# Patient Record
Sex: FEMALE | Race: WHITE | NOT HISPANIC OR LATINO | Employment: FULL TIME | URBAN - METROPOLITAN AREA
[De-identification: names, ages, dates, MRNs, and addresses within clinical notes are randomized per-mention and may not be internally consistent; named-entity substitution may affect disease eponyms.]

---

## 2017-12-14 ENCOUNTER — LAB REQUISITION (OUTPATIENT)
Dept: LAB | Facility: HOSPITAL | Age: 32
End: 2017-12-14

## 2017-12-14 DIAGNOSIS — K82.8 OTHER SPECIFIED DISEASES OF GALLBLADDER (CODE): ICD-10-CM

## 2017-12-14 PROCEDURE — 88304 TISSUE EXAM BY PATHOLOGIST: CPT | Performed by: SURGERY

## 2017-12-15 LAB
CYTO UR: NORMAL
LAB AP CASE REPORT: NORMAL
LAB AP CLINICAL INFORMATION: NORMAL
Lab: NORMAL
PATH REPORT.FINAL DX SPEC: NORMAL
PATH REPORT.GROSS SPEC: NORMAL

## 2019-01-10 ENCOUNTER — LAB REQUISITION (OUTPATIENT)
Dept: LAB | Facility: HOSPITAL | Age: 34
End: 2019-01-10

## 2019-01-10 ENCOUNTER — APPOINTMENT (OUTPATIENT)
Dept: LAB | Facility: HOSPITAL | Age: 34
End: 2019-01-10

## 2019-01-10 DIAGNOSIS — Z00.00 ROUTINE GENERAL MEDICAL EXAMINATION AT A HEALTH CARE FACILITY: ICD-10-CM

## 2019-01-10 LAB
TESTOST SERPL-MCNC: 72.97 NG/DL (ref 0–813.86)
TSH SERPL DL<=0.05 MIU/L-ACNC: 1.81 MIU/ML (ref 0.35–5.35)

## 2019-01-10 PROCEDURE — 84403 ASSAY OF TOTAL TESTOSTERONE: CPT | Performed by: OBSTETRICS & GYNECOLOGY

## 2019-01-10 PROCEDURE — 84443 ASSAY THYROID STIM HORMONE: CPT | Performed by: OBSTETRICS & GYNECOLOGY

## 2019-01-10 PROCEDURE — 36415 COLL VENOUS BLD VENIPUNCTURE: CPT | Performed by: OBSTETRICS & GYNECOLOGY

## 2019-01-11 ENCOUNTER — TRANSCRIBE ORDERS (OUTPATIENT)
Dept: ADMINISTRATIVE | Facility: HOSPITAL | Age: 34
End: 2019-01-11

## 2019-01-11 DIAGNOSIS — Z80.3 FAMILY HISTORY OF MALIGNANT NEOPLASM OF BREAST: ICD-10-CM

## 2019-01-11 DIAGNOSIS — Z12.31 VISIT FOR SCREENING MAMMOGRAM: Primary | ICD-10-CM

## 2019-02-06 ENCOUNTER — OFFICE VISIT (OUTPATIENT)
Dept: RETAIL CLINIC | Facility: CLINIC | Age: 34
End: 2019-02-06

## 2019-02-06 VITALS
TEMPERATURE: 97.6 F | DIASTOLIC BLOOD PRESSURE: 82 MMHG | SYSTOLIC BLOOD PRESSURE: 128 MMHG | HEIGHT: 66 IN | HEART RATE: 104 BPM | BODY MASS INDEX: 36.96 KG/M2 | RESPIRATION RATE: 12 BRPM | WEIGHT: 230 LBS | OXYGEN SATURATION: 98 %

## 2019-02-06 DIAGNOSIS — J02.9 SORETHROAT: Primary | ICD-10-CM

## 2019-02-06 DIAGNOSIS — R68.89 FLU-LIKE SYMPTOMS: ICD-10-CM

## 2019-02-06 LAB
EXPIRATION DATE: NORMAL
EXPIRATION DATE: NORMAL
FLUAV AG NPH QL: NEGATIVE
FLUBV AG NPH QL: NEGATIVE
INTERNAL CONTROL: NORMAL
INTERNAL CONTROL: NORMAL
Lab: NORMAL
Lab: NORMAL
S PYO AG THROAT QL: NEGATIVE

## 2019-02-06 PROCEDURE — 87880 STREP A ASSAY W/OPTIC: CPT | Performed by: NURSE PRACTITIONER

## 2019-02-06 PROCEDURE — 87804 INFLUENZA ASSAY W/OPTIC: CPT | Performed by: NURSE PRACTITIONER

## 2019-02-06 PROCEDURE — 99213 OFFICE O/P EST LOW 20 MIN: CPT | Performed by: NURSE PRACTITIONER

## 2019-02-06 RX ORDER — ATORVASTATIN CALCIUM 40 MG/1
TABLET, FILM COATED ORAL
COMMUNITY
Start: 2018-11-28 | End: 2020-09-23 | Stop reason: SDUPTHER

## 2019-02-06 RX ORDER — LOSARTAN POTASSIUM 100 MG/1
TABLET ORAL
COMMUNITY
Start: 2018-11-28 | End: 2020-09-23 | Stop reason: SDUPTHER

## 2019-02-06 RX ORDER — HYDROCHLOROTHIAZIDE 12.5 MG/1
TABLET ORAL
COMMUNITY
Start: 2018-11-28 | End: 2020-12-22 | Stop reason: ALTCHOICE

## 2019-02-06 RX ORDER — METFORMIN HYDROCHLORIDE 500 MG/1
TABLET, EXTENDED RELEASE ORAL
COMMUNITY
Start: 2018-11-28 | End: 2020-09-23

## 2019-02-06 RX ORDER — AZITHROMYCIN 250 MG/1
TABLET, FILM COATED ORAL
Qty: 6 TABLET | Refills: 0 | Status: SHIPPED | OUTPATIENT
Start: 2019-02-06 | End: 2020-12-22 | Stop reason: ALTCHOICE

## 2019-02-06 RX ORDER — DEXTROMETHORPHAN HYDROBROMIDE AND PROMETHAZINE HYDROCHLORIDE 15; 6.25 MG/5ML; MG/5ML
5 SYRUP ORAL 4 TIMES DAILY PRN
Qty: 120 ML | Refills: 0 | Status: SHIPPED | OUTPATIENT
Start: 2019-02-06 | End: 2019-02-11

## 2019-02-06 NOTE — PROGRESS NOTES
"Ramiro George is a 33 y.o. female.   /82   Pulse 104   Temp 97.6 °F (36.4 °C) (Oral)   Resp 12   Ht 167.6 cm (66\")   Wt 104 kg (230 lb)   SpO2 98%   BMI 37.12 kg/m²   No past medical history on file.  No Known Allergies      Sore Throat    This is a new problem. Episode onset: past 2 days. The problem has been gradually worsening. Maximum temperature: subjective. The pain is moderate. Associated symptoms include congestion, coughing and headaches. Pertinent negatives include no abdominal pain, diarrhea, drooling, ear discharge, ear pain, hoarse voice, plugged ear sensation, neck pain, shortness of breath, stridor, swollen glands, trouble swallowing or vomiting.        The following portions of the patient's history were reviewed and updated as appropriate: allergies, current medications, past family history, past medical history, past social history, past surgical history and problem list.    Review of Systems   HENT: Positive for congestion and sore throat. Negative for drooling, ear discharge, ear pain, hoarse voice and trouble swallowing.    Respiratory: Positive for cough. Negative for shortness of breath and stridor.    Gastrointestinal: Negative for abdominal pain, diarrhea and vomiting.   Musculoskeletal: Negative for neck pain.   Neurological: Positive for headaches.       Objective   Physical Exam   Constitutional: She appears well-developed and well-nourished.  Non-toxic appearance. She appears ill.   HENT:   Head: Normocephalic and atraumatic.   Right Ear: Tympanic membrane and ear canal normal.   Left Ear: Tympanic membrane and ear canal normal.   Nose: Mucosal edema and rhinorrhea present. Right sinus exhibits no maxillary sinus tenderness and no frontal sinus tenderness. Left sinus exhibits no maxillary sinus tenderness and no frontal sinus tenderness.   Mouth/Throat: Uvula is midline. Posterior oropharyngeal erythema present.   Cardiovascular: Regular rhythm and normal heart " sounds.   Pulmonary/Chest: Effort normal. She has no wheezes. She has no rhonchi. She has no rales.   Lymphadenopathy:     She has no cervical adenopathy.   Skin: Skin is warm and dry.   + flu exposure    Assessment/Plan   Anita was seen today for sinusitis, headache, sore throat and generalized body aches.    Diagnoses and all orders for this visit:    Sorethroat  -     POC Rapid Strep A    Flu-like symptoms  -     POC Influenza A / B    Other orders  -     promethazine-dextromethorphan (PROMETHAZINE-DM) 6.25-15 MG/5ML syrup; Take 5 mL by mouth 4 (Four) Times a Day As Needed for Cough for up to 5 days.  -     azithromycin (ZITHROMAX Z-DARIA) 250 MG tablet; Take 2 tablets the first day, then 1 tablet daily for 4 days.    Pt advised this is likely viral in nature. ONLY start antibiotic if symptoms last longer than 7 days. Pt states understanding.       Pt offered tamiflu but declined.   Results for orders placed or performed in visit on 02/06/19   POC Rapid Strep A   Result Value Ref Range    Rapid Strep A Screen Negative Negative, VALID, INVALID, Not Performed    Internal Control Passed Passed    Lot Number [id number]     Expiration Date 6,302,020    POC Influenza A / B   Result Value Ref Range    Rapid Influenza A Ag Negative Negative    Rapid Influenza B Ag Negative Negative    Internal Control Passed Passed    Lot Number [id number]     Expiration Date 5,162,021

## 2019-02-06 NOTE — PATIENT INSTRUCTIONS
Viral Respiratory Infection  A respiratory infection is an illness that affects part of the respiratory system, such as the lungs, nose, or throat. Most respiratory infections are caused by either viruses or bacteria. A respiratory infection that is caused by a virus is called a viral respiratory infection.  Common types of viral respiratory infections include:  · A cold.  · The flu (influenza).  · A respiratory syncytial virus (RSV) infection.    How do I know if I have a viral respiratory infection?  Most viral respiratory infections cause:  · A stuffy or runny nose.  · Yellow or green nasal discharge.  · A cough.  · Sneezing.  · Fatigue.  · Achy muscles.  · A sore throat.  · Sweating or chills.  · A fever.  · A headache.    How are viral respiratory infections treated?  If influenza is diagnosed early, it may be treated with an antiviral medicine that shortens the length of time a person has symptoms. Symptoms of viral respiratory infections may be treated with over-the-counter and prescription medicines, such as:  · Expectorants. These make it easier to cough up mucus.  · Decongestant nasal sprays.    Health care providers do not prescribe antibiotic medicines for viral infections. This is because antibiotics are designed to kill bacteria. They have no effect on viruses.  How do I know if I should stay home from work or school?  To avoid exposing others to your respiratory infection, stay home if you have:  · A fever.  · A persistent cough.  · A sore throat.  · A runny nose.  · Sneezing.  · Muscles aches.  · Headaches.  · Fatigue.  · Weakness.  · Chills.  · Sweating.  · Nausea.    Follow these instructions at home:  · Rest as much as possible.  · Take over-the-counter and prescription medicines only as told by your health care provider.  · Drink enough fluid to keep your urine clear or pale yellow. This helps prevent dehydration and helps loosen up mucus.  · Gargle with a salt-water mixture 3-4 times per day or  as needed. To make a salt-water mixture, completely dissolve ½-1 tsp of salt in 1 cup of warm water.  · Use nose drops made from salt water to ease congestion and soften raw skin around your nose.  · Do not drink alcohol.  · Do not use tobacco products, including cigarettes, chewing tobacco, and e-cigarettes. If you need help quitting, ask your health care provider.  Contact a health care provider if:  · Your symptoms last for 10 days or longer.  · Your symptoms get worse over time.  · You have a fever.  · You have severe sinus pain in your face or forehead.  · The glands in your jaw or neck become very swollen.  Get help right away if:  · You feel pain or pressure in your chest.  · You have shortness of breath.  · You faint or feel like you will faint.  · You have severe and persistent vomiting.  · You feel confused or disoriented.  This information is not intended to replace advice given to you by your health care provider. Make sure you discuss any questions you have with your health care provider.  Document Released: 09/27/2006 Document Revised: 05/25/2017 Document Reviewed: 05/25/2016  [employer] Education © 2018 [employer]

## 2019-02-22 ENCOUNTER — HOSPITAL ENCOUNTER (OUTPATIENT)
Dept: MAMMOGRAPHY | Facility: HOSPITAL | Age: 34
End: 2019-02-22
Attending: OBSTETRICS & GYNECOLOGY

## 2019-03-07 ENCOUNTER — HOSPITAL ENCOUNTER (OUTPATIENT)
Dept: MAMMOGRAPHY | Facility: HOSPITAL | Age: 34
Discharge: HOME | End: 2019-03-07
Attending: OBSTETRICS & GYNECOLOGY | Admitting: OBSTETRICS & GYNECOLOGY

## 2019-03-07 DIAGNOSIS — Z80.3 FAMILY HISTORY OF MALIGNANT NEOPLASM OF BREAST: ICD-10-CM

## 2019-03-07 DIAGNOSIS — Z12.31 VISIT FOR SCREENING MAMMOGRAM: ICD-10-CM

## 2019-03-07 PROCEDURE — 77067 SCR MAMMO BI INCL CAD: CPT | Performed by: RADIOLOGY

## 2019-03-07 PROCEDURE — 77063 BREAST TOMOSYNTHESIS BI: CPT

## 2019-03-07 PROCEDURE — 77067 SCR MAMMO BI INCL CAD: CPT

## 2019-03-07 PROCEDURE — 77063 BREAST TOMOSYNTHESIS BI: CPT | Performed by: RADIOLOGY

## 2020-08-21 RX ORDER — FLUCONAZOLE 100 MG/1
100 TABLET ORAL DAILY
Qty: 3 TABLET | Refills: 0 | Status: SHIPPED | OUTPATIENT
Start: 2020-08-21 | End: 2020-08-24

## 2020-09-23 ENCOUNTER — OFFICE VISIT (OUTPATIENT)
Dept: FAMILY MEDICINE CLINIC | Facility: CLINIC | Age: 35
End: 2020-09-23

## 2020-09-23 VITALS
HEART RATE: 90 BPM | SYSTOLIC BLOOD PRESSURE: 116 MMHG | HEIGHT: 66 IN | DIASTOLIC BLOOD PRESSURE: 82 MMHG | WEIGHT: 190 LBS | TEMPERATURE: 98.1 F | BODY MASS INDEX: 30.53 KG/M2 | RESPIRATION RATE: 16 BRPM | OXYGEN SATURATION: 98 %

## 2020-09-23 DIAGNOSIS — K30 INDIGESTION: ICD-10-CM

## 2020-09-23 DIAGNOSIS — Z00.00 ENCOUNTER FOR WELL ADULT EXAM WITHOUT ABNORMAL FINDINGS: Primary | ICD-10-CM

## 2020-09-23 DIAGNOSIS — Z11.59 NEED FOR HEPATITIS C SCREENING TEST: ICD-10-CM

## 2020-09-23 DIAGNOSIS — E83.42 HYPOMAGNESEMIA: ICD-10-CM

## 2020-09-23 DIAGNOSIS — F41.8 DEPRESSION WITH ANXIETY: ICD-10-CM

## 2020-09-23 DIAGNOSIS — E66.9 CLASS 1 OBESITY WITH SERIOUS COMORBIDITY AND BODY MASS INDEX (BMI) OF 30.0 TO 30.9 IN ADULT, UNSPECIFIED OBESITY TYPE: ICD-10-CM

## 2020-09-23 DIAGNOSIS — I10 ESSENTIAL HYPERTENSION: ICD-10-CM

## 2020-09-23 DIAGNOSIS — E78.2 MIXED HYPERLIPIDEMIA: ICD-10-CM

## 2020-09-23 DIAGNOSIS — E55.9 VITAMIN D DEFICIENCY: ICD-10-CM

## 2020-09-23 PROBLEM — Z79.4 TYPE 2 DIABETES MELLITUS WITHOUT COMPLICATION, WITH LONG-TERM CURRENT USE OF INSULIN: Status: ACTIVE | Noted: 2020-09-23

## 2020-09-23 PROBLEM — E11.9 TYPE 2 DIABETES MELLITUS WITHOUT COMPLICATION, WITH LONG-TERM CURRENT USE OF INSULIN: Status: ACTIVE | Noted: 2020-09-23

## 2020-09-23 PROCEDURE — 99385 PREV VISIT NEW AGE 18-39: CPT | Performed by: PHYSICIAN ASSISTANT

## 2020-09-23 RX ORDER — BUPROPION HYDROCHLORIDE 150 MG/1
150 TABLET ORAL DAILY
Qty: 30 TABLET | Refills: 0 | Status: SHIPPED | OUTPATIENT
Start: 2020-09-23 | End: 2021-09-07

## 2020-09-23 RX ORDER — BUPROPION HYDROCHLORIDE 300 MG/1
TABLET ORAL
COMMUNITY
Start: 2020-09-17 | End: 2020-09-23

## 2020-09-23 RX ORDER — LOSARTAN POTASSIUM 100 MG/1
100 TABLET ORAL DAILY
Qty: 90 TABLET | Refills: 1 | Status: SHIPPED | OUTPATIENT
Start: 2020-09-23 | End: 2021-09-07

## 2020-09-23 RX ORDER — INSULIN GLARGINE 100 [IU]/ML
34 INJECTION, SOLUTION SUBCUTANEOUS NIGHTLY
COMMUNITY
Start: 2020-09-05 | End: 2021-09-07

## 2020-09-23 RX ORDER — OMEPRAZOLE 20 MG/1
20 CAPSULE, DELAYED RELEASE ORAL DAILY
Qty: 14 CAPSULE | Refills: 0 | Status: SHIPPED | OUTPATIENT
Start: 2020-09-23 | End: 2021-01-11 | Stop reason: SDUPTHER

## 2020-09-23 RX ORDER — AMLODIPINE BESYLATE 2.5 MG/1
TABLET ORAL
COMMUNITY
End: 2020-09-23 | Stop reason: SDUPTHER

## 2020-09-23 RX ORDER — ATORVASTATIN CALCIUM 40 MG/1
40 TABLET, FILM COATED ORAL DAILY
Qty: 90 TABLET | Refills: 1 | Status: SHIPPED | OUTPATIENT
Start: 2020-09-23 | End: 2022-10-11

## 2020-09-23 RX ORDER — DULOXETIN HYDROCHLORIDE 20 MG/1
20 CAPSULE, DELAYED RELEASE ORAL DAILY
Qty: 30 CAPSULE | Refills: 0 | Status: SHIPPED | OUTPATIENT
Start: 2020-09-23 | End: 2020-11-02

## 2020-09-23 RX ORDER — EMPAGLIFLOZIN 25 MG/1
TABLET, FILM COATED ORAL
COMMUNITY
Start: 2020-09-05 | End: 2020-11-03 | Stop reason: DRUGHIGH

## 2020-09-23 RX ORDER — AMLODIPINE BESYLATE 2.5 MG/1
2.5 TABLET ORAL DAILY
Qty: 90 TABLET | Refills: 1 | Status: SHIPPED | OUTPATIENT
Start: 2020-09-23 | End: 2021-01-11

## 2020-09-23 RX ORDER — ACYCLOVIR 800 MG/1
TABLET ORAL
COMMUNITY
Start: 2020-08-25 | End: 2020-12-22 | Stop reason: ALTCHOICE

## 2020-09-23 RX ORDER — SEMAGLUTIDE 1.34 MG/ML
INJECTION, SOLUTION SUBCUTANEOUS
COMMUNITY
Start: 2020-08-05 | End: 2020-12-22 | Stop reason: DRUGHIGH

## 2020-09-23 NOTE — PROGRESS NOTES
Chief Complaint   Patient presents with   • Establish Care     Transferring from Dr Dunn (he retired)        Subjective   The patient is a 35 y.o. female who presents to [facility] and for annual exam. 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 Waking up multiple times per night. Hard time shutting brain off   Melatonin gummies, stress relief gummies, and essential oils. Very little improvement. Melatonin makes her feel hung over      Last Colonoscopy:  2020. Normal (chronic issues with bowels, bloating, constipation alternating with diarrhea)      Past Gynecological History:     No LMP recorded. Patient has had an ablation.     Menses: regular every 28-30 days. Started again after ablation recently. Very heavy and cramping      Contraception: tubal ligation       Pap History:was done on approximately earlier this year and the result was: normal PAP.     Date of last mammogram: was done on approximately 1 year ago and the result was: Birads I (Normal). (mother has history of breast cancer)     Mother  of pancreatic cancer     Past Medical History,Medications, Allergies reviewed.     HPI  Having depression, anxiety, irritability   Been on Wellbutrin since April/ May. Causing ear ringing. Feels like symptoms were improving some but does not like the ear ringing   Has tried buspar in the past. Made her feel too loopy   Does not want to be on anything that will make her gain weight   Lost 25 lbs since May. Insulin got regulated. Carb cycling     Stopped HCTZ and metformin     BP well controlled right now     Has hypertension and hyperlipidemia. Due for labs to be checked. Also needs refills on her medications     Has Rx for acyclovir. Was getting sores on the inside of her mouth when she was having dental work and using teeth straightening trays. Has not done trial off.  "No hx of HSV that she is aware of     Endocrinology is managing her DM. Under good control.     Review of Systems   Constitutional: Negative.  Negative for chills, diaphoresis, fatigue and fever.   HENT: Negative.  Negative for congestion, ear discharge, ear pain, hearing loss, nosebleeds, postnasal drip, sinus pressure, sneezing and sore throat.    Eyes: Negative.    Respiratory: Negative.  Negative for cough, chest tightness, shortness of breath and wheezing.    Cardiovascular: Negative.  Negative for chest pain, palpitations and leg swelling.   Gastrointestinal: Positive for abdominal distention, constipation and diarrhea. Negative for abdominal pain, blood in stool, nausea and vomiting.        Belching    Genitourinary: Negative.  Negative for difficulty urinating, dysuria, flank pain, frequency, hematuria and urgency.   Musculoskeletal: Negative.  Negative for arthralgias, back pain, gait problem, joint swelling, myalgias, neck pain and neck stiffness.   Skin: Negative.  Negative for color change, pallor, rash and wound.   Neurological: Negative for dizziness, syncope, weakness, light-headedness, numbness and headaches.   Psychiatric/Behavioral: Positive for agitation, dysphoric mood and sleep disturbance. Negative for self-injury and suicidal ideas. The patient is nervous/anxious.        Objective     /82   Pulse 90   Temp 98.1 °F (36.7 °C)   Resp 16   Ht 167.6 cm (66\")   Wt 86.2 kg (190 lb)   SpO2 98%   BMI 30.67 kg/m²     Physical Exam  Vitals signs and nursing note reviewed.   Constitutional:       Appearance: She is well-developed.   HENT:      Head: Normocephalic and atraumatic.      Right Ear: Tympanic membrane, ear canal and external ear normal.      Left Ear: Tympanic membrane, ear canal and external ear normal.      Nose: Nose normal.      Mouth/Throat:      Pharynx: No oropharyngeal exudate.   Eyes:      Conjunctiva/sclera: Conjunctivae normal.   Neck:      Musculoskeletal: Normal range " of motion and neck supple.      Thyroid: No thyromegaly.      Trachea: No tracheal deviation.   Cardiovascular:      Rate and Rhythm: Normal rate and regular rhythm.      Heart sounds: Normal heart sounds.   Pulmonary:      Effort: Pulmonary effort is normal. No respiratory distress.      Breath sounds: Normal breath sounds. No wheezing or rales.   Chest:      Chest wall: No tenderness.   Abdominal:      General: Bowel sounds are normal. There is no distension.      Palpations: Abdomen is soft. There is no mass.      Tenderness: There is no abdominal tenderness. There is no guarding or rebound.      Hernia: No hernia is present.   Musculoskeletal: Normal range of motion.         General: No tenderness or deformity.   Lymphadenopathy:      Cervical: No cervical adenopathy.   Skin:     General: Skin is warm and dry.   Neurological:      Mental Status: She is alert and oriented to person, place, and time.   Psychiatric:         Mood and Affect: Mood normal.         Behavior: Behavior normal.         Thought Content: Thought content normal.         Judgment: Judgment normal.         Assessment/Plan     1. Encounter for well adult exam without abnormal findings  - CBC & Differential  - Comprehensive Metabolic Panel  - Hepatitis C Antibody  - TSH    2. Essential hypertension  - CBC & Differential  - Comprehensive Metabolic Panel  - amLODIPine (Norvasc) 2.5 MG tablet; Take 1 tablet by mouth Daily.  Dispense: 90 tablet; Refill: 1  - losartan (COZAAR) 100 MG tablet; Take 1 tablet by mouth Daily.  Dispense: 90 tablet; Refill: 1    3. Mixed hyperlipidemia  - CBC & Differential  - Comprehensive Metabolic Panel  - Lipid Panel  - atorvastatin (LIPITOR) 40 MG tablet; Take 1 tablet by mouth Daily.  Dispense: 90 tablet; Refill: 1    4. Need for hepatitis C screening test  - Hepatitis C Antibody    5. Vitamin D deficiency  - Vitamin D 25 Hydroxy    6. Hypomagnesemia  - Magnesium    7. Indigestion  - omeprazole (PrilOSEC) 20 MG  capsule; Take 1 capsule by mouth Daily.  Dispense: 14 capsule; Refill: 0    8. Depression with anxiety  - DULoxetine (Cymbalta) 20 MG capsule; Take 1 capsule by mouth Daily.  Dispense: 30 capsule; Refill: 0  - buPROPion XL (Wellbutrin XL) 150 MG 24 hr tablet; Take 1 tablet by mouth Daily.  Dispense: 30 tablet; Refill: 0    9. Class 1 obesity with serious comorbidity and body mass index (BMI) of 30.0 to 30.9 in adult, unspecified obesity type      Refills as outlined in plan. Will taper off wellbutrin and do trial of cymbalta for anxiety and depression  Labs as outlined in plan   Trial of omeprazole for GI symptoms   Physical form for school board completed and provided back to patient     Counseling was given to patient for the following topics: instructions for management, risk factor reductions, patient and family education, impressions and importance of treatment compliance . Total time of the encounter was 30 minutes and 15 minutes was spend counseling.    MARC Brown

## 2020-09-24 LAB
25(OH)D3+25(OH)D2 SERPL-MCNC: 61.4 NG/ML (ref 30–100)
ALBUMIN SERPL-MCNC: 4.4 G/DL (ref 3.8–4.8)
ALBUMIN/GLOB SERPL: 1.6 {RATIO} (ref 1.2–2.2)
ALP SERPL-CCNC: 85 IU/L (ref 39–117)
ALT SERPL-CCNC: 37 IU/L (ref 0–32)
AST SERPL-CCNC: 21 IU/L (ref 0–40)
BASOPHILS # BLD AUTO: 0 X10E3/UL (ref 0–0.2)
BASOPHILS NFR BLD AUTO: 0 %
BILIRUB SERPL-MCNC: 0.4 MG/DL (ref 0–1.2)
BUN SERPL-MCNC: 12 MG/DL (ref 6–20)
BUN/CREAT SERPL: 14 (ref 9–23)
CALCIUM SERPL-MCNC: 9.7 MG/DL (ref 8.7–10.2)
CHLORIDE SERPL-SCNC: 102 MMOL/L (ref 96–106)
CO2 SERPL-SCNC: 28 MMOL/L (ref 20–29)
CREAT SERPL-MCNC: 0.85 MG/DL (ref 0.57–1)
EOSINOPHIL # BLD AUTO: 0.1 X10E3/UL (ref 0–0.4)
EOSINOPHIL NFR BLD AUTO: 1 %
ERYTHROCYTE [DISTWIDTH] IN BLOOD BY AUTOMATED COUNT: 12.5 % (ref 11.7–15.4)
GLOBULIN SER CALC-MCNC: 2.7 G/DL (ref 1.5–4.5)
GLUCOSE SERPL-MCNC: 89 MG/DL (ref 65–99)
HCT VFR BLD AUTO: 44.6 % (ref 34–46.6)
HCV AB S/CO SERPL IA: <0.1 S/CO RATIO (ref 0–0.9)
HGB BLD-MCNC: 14.6 G/DL (ref 11.1–15.9)
IMM GRANULOCYTES # BLD AUTO: 0 X10E3/UL (ref 0–0.1)
IMM GRANULOCYTES NFR BLD AUTO: 0 %
LYMPHOCYTES # BLD AUTO: 4.5 X10E3/UL (ref 0.7–3.1)
LYMPHOCYTES NFR BLD AUTO: 33 %
MAGNESIUM SERPL-MCNC: 2 MG/DL (ref 1.6–2.3)
MCH RBC QN AUTO: 29.1 PG (ref 26.6–33)
MCHC RBC AUTO-ENTMCNC: 32.7 G/DL (ref 31.5–35.7)
MCV RBC AUTO: 89 FL (ref 79–97)
MONOCYTES # BLD AUTO: 0.7 X10E3/UL (ref 0.1–0.9)
MONOCYTES NFR BLD AUTO: 5 %
NEUTROPHILS # BLD AUTO: 8.3 X10E3/UL (ref 1.4–7)
NEUTROPHILS NFR BLD AUTO: 61 %
PLATELET # BLD AUTO: 448 X10E3/UL (ref 150–450)
POTASSIUM SERPL-SCNC: 4.2 MMOL/L (ref 3.5–5.2)
PROT SERPL-MCNC: 7.1 G/DL (ref 6–8.5)
RBC # BLD AUTO: 5.01 X10E6/UL (ref 3.77–5.28)
SODIUM SERPL-SCNC: 142 MMOL/L (ref 134–144)
TSH SERPL DL<=0.005 MIU/L-ACNC: 1.42 UIU/ML (ref 0.45–4.5)
WBC # BLD AUTO: 13.7 X10E3/UL (ref 3.4–10.8)

## 2020-09-28 NOTE — PROGRESS NOTES
I have reviewed the notes, assessments, and/or procedures performed by Jewell Granado, I concur with her documentation of Anita George.

## 2020-10-11 DIAGNOSIS — D72.829 LEUKOCYTOSIS, UNSPECIFIED TYPE: Primary | ICD-10-CM

## 2020-10-12 ENCOUNTER — TELEPHONE (OUTPATIENT)
Dept: FAMILY MEDICINE CLINIC | Facility: CLINIC | Age: 35
End: 2020-10-12

## 2020-10-12 NOTE — TELEPHONE ENCOUNTER
PT IS NEEDING A NEW REFERRAL PLACED FOR DR ZHOU ITS BEEN OVER 5 YEARS SINCE SHE HAD BEEN SEEN WITH DR ZHOU.  THEY NEED A NEW REFERRAL; HER OFFICE NOTE AND LAB WORK.  SHE NEEDS TO TRY AND GET IN THIS WEEK BY Friday WITH THEM SINCE SHE IS OFF WORK THIS WEEK.

## 2020-10-13 ENCOUNTER — TELEPHONE (OUTPATIENT)
Dept: FAMILY MEDICINE CLINIC | Facility: CLINIC | Age: 35
End: 2020-10-13

## 2020-10-13 DIAGNOSIS — B37.31 VAGINAL YEAST INFECTION: Primary | ICD-10-CM

## 2020-10-13 DIAGNOSIS — D72.829 LEUKOCYTOSIS, UNSPECIFIED TYPE: Primary | ICD-10-CM

## 2020-10-13 RX ORDER — FLUCONAZOLE 150 MG/1
150 TABLET ORAL ONCE
Qty: 1 TABLET | Refills: 0 | Status: SHIPPED | OUTPATIENT
Start: 2020-10-13 | End: 2020-10-13

## 2020-10-13 NOTE — TELEPHONE ENCOUNTER
Pt called she has a yeast inf, symptoms include white discharge, itching, and vaginal irritation. Wanted to see if you would rx for diflucan

## 2020-10-16 ENCOUNTER — CONSULT (OUTPATIENT)
Dept: ONCOLOGY | Facility: CLINIC | Age: 35
End: 2020-10-16

## 2020-10-16 ENCOUNTER — RESULTS ENCOUNTER (OUTPATIENT)
Dept: FAMILY MEDICINE CLINIC | Facility: CLINIC | Age: 35
End: 2020-10-16

## 2020-10-16 ENCOUNTER — LAB (OUTPATIENT)
Dept: LAB | Facility: HOSPITAL | Age: 35
End: 2020-10-16

## 2020-10-16 VITALS
HEART RATE: 87 BPM | SYSTOLIC BLOOD PRESSURE: 138 MMHG | OXYGEN SATURATION: 97 % | RESPIRATION RATE: 16 BRPM | BODY MASS INDEX: 30.37 KG/M2 | DIASTOLIC BLOOD PRESSURE: 62 MMHG | HEIGHT: 66 IN | WEIGHT: 189 LBS | TEMPERATURE: 98 F

## 2020-10-16 DIAGNOSIS — D72.829 LEUKOCYTOSIS, UNSPECIFIED TYPE: ICD-10-CM

## 2020-10-16 DIAGNOSIS — Z80.0 FAMILY HISTORY OF PANCREATIC CANCER: Primary | ICD-10-CM

## 2020-10-16 DIAGNOSIS — Z80.0 FAMILY HISTORY OF PANCREATIC CANCER: ICD-10-CM

## 2020-10-16 LAB
ALBUMIN SERPL-MCNC: 4.6 G/DL (ref 3.5–5.2)
ALBUMIN/GLOB SERPL: 1.7 G/DL
ALP SERPL-CCNC: 79 U/L (ref 39–117)
ALT SERPL W P-5'-P-CCNC: 29 U/L (ref 1–33)
ANION GAP SERPL CALCULATED.3IONS-SCNC: 9 MMOL/L (ref 5–15)
AST SERPL-CCNC: 22 U/L (ref 1–32)
BASOPHILS # BLD MANUAL: 0 10*3/MM3 (ref 0–0.2)
BASOPHILS NFR BLD AUTO: 0 % (ref 0–1.5)
BILIRUB SERPL-MCNC: 0.5 MG/DL (ref 0–1.2)
BUN SERPL-MCNC: 14 MG/DL (ref 6–20)
BUN/CREAT SERPL: 18.7 (ref 7–25)
CALCIUM SPEC-SCNC: 9.4 MG/DL (ref 8.6–10.5)
CHLORIDE SERPL-SCNC: 105 MMOL/L (ref 98–107)
CO2 SERPL-SCNC: 29 MMOL/L (ref 22–29)
CREAT SERPL-MCNC: 0.75 MG/DL (ref 0.57–1)
CRP SERPL-MCNC: 0.12 MG/DL (ref 0–0.5)
DEPRECATED RDW RBC AUTO: 39.8 FL (ref 37–54)
EOSINOPHIL # BLD MANUAL: 0.29 10*3/MM3 (ref 0–0.4)
EOSINOPHIL NFR BLD MANUAL: 2 % (ref 0.3–6.2)
ERYTHROCYTE [DISTWIDTH] IN BLOOD BY AUTOMATED COUNT: 12.4 % (ref 12.3–15.4)
ERYTHROCYTE [SEDIMENTATION RATE] IN BLOOD: 21 MM/HR (ref 0–20)
GFR SERPL CREATININE-BSD FRML MDRD: 87 ML/MIN/1.73
GLOBULIN UR ELPH-MCNC: 2.7 GM/DL
GLUCOSE SERPL-MCNC: 77 MG/DL (ref 65–99)
HCT VFR BLD AUTO: 43.7 % (ref 34–46.6)
HGB BLD-MCNC: 14.4 G/DL (ref 12–15.9)
LYMPHOCYTES # BLD MANUAL: 5.45 10*3/MM3 (ref 0.7–3.1)
LYMPHOCYTES NFR BLD MANUAL: 10 % (ref 5–12)
LYMPHOCYTES NFR BLD MANUAL: 38 % (ref 19.6–45.3)
MCH RBC QN AUTO: 29.1 PG (ref 26.6–33)
MCHC RBC AUTO-ENTMCNC: 33 G/DL (ref 31.5–35.7)
MCV RBC AUTO: 88.3 FL (ref 79–97)
MONOCYTES # BLD AUTO: 1.43 10*3/MM3 (ref 0.1–0.9)
NEUTROPHILS # BLD AUTO: 6.59 10*3/MM3 (ref 1.7–7)
NEUTROPHILS NFR BLD MANUAL: 46 % (ref 42.7–76)
PLAT MORPH BLD: NORMAL
PLATELET # BLD AUTO: 473 10*3/MM3 (ref 140–450)
PMV BLD AUTO: 9.2 FL (ref 6–12)
POTASSIUM SERPL-SCNC: 3.7 MMOL/L (ref 3.5–5.2)
PROT SERPL-MCNC: 7.3 G/DL (ref 6–8.5)
RBC # BLD AUTO: 4.95 10*6/MM3 (ref 3.77–5.28)
RBC MORPH BLD: NORMAL
SODIUM SERPL-SCNC: 143 MMOL/L (ref 136–145)
VARIANT LYMPHS NFR BLD MANUAL: 4 % (ref 0–5)
WBC # BLD AUTO: 14.33 10*3/MM3 (ref 3.4–10.8)
WBC MORPH BLD: NORMAL

## 2020-10-16 PROCEDURE — 81206 BCR/ABL1 GENE MAJOR BP: CPT

## 2020-10-16 PROCEDURE — 85060 BLOOD SMEAR INTERPRETATION: CPT

## 2020-10-16 PROCEDURE — 85007 BL SMEAR W/DIFF WBC COUNT: CPT | Performed by: PHYSICIAN ASSISTANT

## 2020-10-16 PROCEDURE — 85652 RBC SED RATE AUTOMATED: CPT

## 2020-10-16 PROCEDURE — 80053 COMPREHEN METABOLIC PANEL: CPT

## 2020-10-16 PROCEDURE — 86140 C-REACTIVE PROTEIN: CPT

## 2020-10-16 PROCEDURE — 81207 BCR/ABL1 GENE MINOR BP: CPT

## 2020-10-16 PROCEDURE — 99204 OFFICE O/P NEW MOD 45 MIN: CPT | Performed by: INTERNAL MEDICINE

## 2020-10-16 PROCEDURE — 36415 COLL VENOUS BLD VENIPUNCTURE: CPT

## 2020-10-16 PROCEDURE — 85027 COMPLETE CBC AUTOMATED: CPT | Performed by: PHYSICIAN ASSISTANT

## 2020-10-16 RX ORDER — ACYCLOVIR 400 MG/1
TABLET ORAL
COMMUNITY
Start: 2020-10-06 | End: 2020-12-22 | Stop reason: ALTCHOICE

## 2020-10-16 NOTE — PROGRESS NOTES
CHIEF COMPLAINT: Chronic leukocytosis with family history of breast and pancreatic cancer in her mother    REASON FOR REFERRAL: Same      RECORDS OBTAINED  Records of the patients history including those obtained from Dr. Jones were reviewed and summarized in detail.    Oncology/Hematology History Overview Note   1.  Chronic leukocytosis  2.  Anxiety depression  3.  Reflux with history of H. pylori positivity  4.  Questionable psoriatic arthritis with presumably falsely positive FARIBA and rheumatoid arthritis rheumatoid factor positive  5.  Migraines  6.  Dyslipidemia  7.  Diabetes  8.  Polycystic ovaries   9.  History of renal calculi  10.  History of pulmonary nodules with unremarkable CT per Dr. Goodwin December 2015  11.  Cholecystectomy for cholecystitis by Dr. epps 12/14/2017  12.  Atopic dermatitis  13.  Exogenous obesity  14.  Mother had breast cancer and pancreatic cancer (patient of mine)    Hematology history:  -8/10/2015 initial [facility] hematology consultation:referred to our office for further evaluation and recommendations in  regard to her leukocytosis. The patient states that for about the past 9 months  or so she has been feeling extremely fatigued to the point that it is  interfering with her daily life. She states that she was noted to have had  slightly elevated white blood cell count on a routine lab work initially  several months ago but at that time had attributed the slight elevation to a  possible sinus infection. She states that on further evaluation in regard to  her ongoing fatigue, more blood work was drawn and she was noted to have an  elevated white count that continued. Recent lab work includes CBC on 08/10/2015  with WBC 11.9, hemoglobin 13.2, hematocrit 38.8%, platelet count 343,000.  Differential was within normal limits other than for absolute lymphocytes  slightly elevated at 5.1.  Previous CBCs include 07/07/2015: WBC 13.9, normal  hemoglobin 13.5, hematocrit 40.8%, platelets  "slightly elevated at 398,000 and  differential within normal limits other than for absolute neutrophils of 8.2  and absolute lymphocytes of 5.0. At that same time, sedimentation rate was  normal at 6, T3 normal at 2.7, vitamin D 25-hydroxy was normal at 31.2.      The patient denies any fevers. She does report diffuse joint pain that is worse  in her right wrist and index finger. She also had some tenderness and slight  discomfort in her breast that seems to occur around the time of her menstrual  cycle. She has dry mouth and occasionally will notice a small ulceration in the  inner part of cheek. She also states that she has a nasal ulceration that seems  to \"come and go\".  She has not noted any heart palpitations or chest pain. She  has no shortness of breath other than for the feelings of shortness of breath  that seem to come with extreme fatigue. She has occasional swelling of her feet  that waxes and wanes. She has a history of migraine headaches. She feels  generalized weakness. She reports occasional numbness and tingling in her  hands. She reports a 15 pound weight gain over the past 10 months. She states  that she has chronic constipation, but denies any hematochezia or melena.     I suspect this is a reactive leukocytosis.  The odds of chronic myelogenous leukemia at this young age is very unlikely. We  will check her sedimentation rate and C-reactive protein again, the latter of  which had been a little elevated she says but the sedimentation rate has never  been abnormal thus far. We will also check her serum immunoelectrophoresis. We  will look at her peripheral smear for immature forms. Assuming all this is  negative, we planned to draw at that junction her BCR/ABL gene rearrangement quantitative PCR but I think the odds of that at this young age being chronic myelogenous leukemia is very unlikely and I suspect she has some form of low level  autoimmune disease with the positive FARIBA that is causing " her myalgias,  arthralgias, and fatigue with joint stiffness when she gets up, better when she  gets moving, and some pain in her calcaneus, worse on the right. This could  also be psoriatic based on my examination but I defer of course to the  expertise of Dr. Stevenson Umana.      -8/10/2015 data showed serum immunoelectrophoresis showed polyclonal gammopathy consistent with inflammation.  Peripheral smear showed slight left shift with normal morphology otherwise.  White count was down to normal at 10,580.  C-reactive protein elevated at 7.58 with sedimentation rate of 26.    -8/19/2015 hematology follow-up visit: I reviewed her elevated sedimentation rate and C-reactive protein in the face of rheumatoid factor positive rheumatoid arthritis and psoriatic arthritis followed by Stevenson Umana and given her young age did not recommend BCR abl gene rearrangement testing given low likelihood for myeloproliferative disorder in the face of what is clearly an inflammatory process and to follow-up with rheumatology and primary care.  -12/12/2017 EGD with Dr. Selby on duodenal biopsy showed minimal chronic gastritis without metaplasia dysplasia or malignancy and colonoscopy was negative save for a hyperplastic polyp.  He ruled her out for celiac disease but her white count was 12,700 with a C-reactive protein still elevated at 14.6.  Strongyloidiasis negative.  -5/2/2019 white count normal 7700 with normal CBC, ALT 33, AST 42, sedimentation rate 19.  C-reactive protein 27 upper limit of normal 8.  Normal B12 and folate.  -7/12/2019 saw Dr. Akil Escobar at [facility] rheumatology who thought FARIBA was falsely positive.  He did not appreciate any obvious connective tissue disease but felt she had primary fibromyalgia syndrome.  He could not discern any systemic inflammatory symptoms.  He did not agree with her prior diagnosis of psoriatic arthritis and did not want her taking methotrexate.  Eyelid abnormalities he  thought from eczema or dermatitis and suggested follow-up with dermatology.  She did have a few psoriatic patches in her skin but without clinical evidence of psoriatic arthritis  -9/30/2019 C-reactive protein 15.2 upper limit of normal 8 with sedimentation rate 14 and normal liver enzymes.  FARIBA +1: 80 homogeneous.Placed her on Cymbalta.  -1/24/2020 CT abdomen pelvis with contrast [facility] showed hepato-steatosis, stable 3 mm lung nodule benign, punctate renal stones, increasing fatty atrophy of the pancreatic head.  Done for evaluation of elevated liver enzymes and A1c.  -2/27/2020 [facility] showed mild fatty atrophy pancreatic head with no solid or cystic pancreatic lesions.  Endoscopic ultrasound showed mild erythema gastric antrum.  Had fatty infiltration of the pancreas diffusely and fatty liver infiltration.  No significant pathology otherwise.  Based on if pancreatic fluid bicarbonate less than 80 mmol/L than that would be pancreatic insufficiency but if over 80 mmol/L than they would consider normal pancreatic function.  -9/23/2020 white count 13,700 with unremarkable differential and no immature forms with CMP unremarkable save for ALT slightly elevated at 37 upper limit 32 and negative hepatitis C antibody.  Primary care referred back again for the same reason.  -10/16/2020 [facility] hematology consultation: Patient returned again for same issue of chronic leukocytosis.  Now 5 years later with similar level of leukocytosis as to 5 years previous.  We will repeat her CBC, peripheral smear, CMP, urinalysis, sedimentation rate, C-reactive protein, and given all of the questions and despite my low suspicions for there being anything going on here given my prior statements and yet patient returning for same issue, I will check peripheral blood flow cytometry for circulating clone and BCR abl gene rearrangement which would be exceedingly unlikely to appear at the age of 35 and  actually younger since this been going on for quite some time.  The white count is almost assuredly related to the multiplicity of potential inflammatory causes as outlined above and unlikely to be pathological in and of itself.  There have never been significant nucleated red cells, immature forms, fragmented red cells or other cytopenias to go along with this.  She is on no medications that should routinely elevate the white count.  I doubt the white count has anything to do with a malignant process but with her mother's history I will get genetic counseling as her mother had pancreatic cancer come on quickly and did not have genetic testing results before her passing.       Leucocytosis       HISTORY OF PRESENT ILLNESS:  The patient is a 35 y.o.  female, referred for chronic leukocytosis.  See above for oncology/hematology history    REVIEW OF SYSTEMS:  A 14 point review of systems was performed and is negative except as noted above.    History reviewed. No pertinent past medical history.  History reviewed. No pertinent surgical history.    Current Outpatient Medications on File Prior to Visit   Medication Sig Dispense Refill   • acyclovir (ZOVIRAX) 400 MG tablet TAKE 2 TABLETS BY MOUTH TWICE A DAY FOR 2 DAYS THEN 1 TWICE A DAY     • acyclovir (ZOVIRAX) 800 MG tablet TK 1 T PO TID FOR 2 DAYS THEN 1/2 TABLET  BID FOR SUPPRESSION     • amLODIPine (Norvasc) 2.5 MG tablet Take 1 tablet by mouth Daily. 90 tablet 1   • atorvastatin (LIPITOR) 40 MG tablet Take 1 tablet by mouth Daily. 90 tablet 1   • azithromycin (ZITHROMAX Z-DARIA) 250 MG tablet Take 2 tablets the first day, then 1 tablet daily for 4 days. 6 tablet 0   • buPROPion XL (Wellbutrin XL) 150 MG 24 hr tablet Take 1 tablet by mouth Daily. 30 tablet 0   • DULoxetine (Cymbalta) 20 MG capsule Take 1 capsule by mouth Daily. 30 capsule 0   • hydrochlorothiazide (HYDRODIURIL) 12.5 MG tablet      • Jardiance 25 MG tablet TAKE 1 TABLET  PO QD     • Lantus SoloStar 100  "UNIT/ML injection pen 34 Units Every Night.     • losartan (COZAAR) 100 MG tablet Take 1 tablet by mouth Daily. 90 tablet 1   • Multiple Vitamins-Minerals (MULTIVITAMIN ADULT EXTRA C PO) Take  by mouth Daily.     • omeprazole (PrilOSEC) 20 MG capsule Take 1 capsule by mouth Daily. 14 capsule 0   • Ozempic, 0.25 or 0.5 MG/DOSE, 2 MG/1.5ML solution pen-injector INJECT 0.5 MG SQ Q WEEK     • Probiotic Product (PROBIOTIC-10 PO) Take  by mouth.       No current facility-administered medications on file prior to visit.        No Known Allergies    Social History     Socioeconomic History   • Marital status:      Spouse name: Not on file   • Number of children: Not on file   • Years of education: Not on file   • Highest education level: Not on file   Tobacco Use   • Smoking status: Never Smoker   • Smokeless tobacco: Never Used   Substance and Sexual Activity   • Alcohol use: Yes     Comment: Socially   • Drug use: Never   • Sexual activity: Defer       Family History   Problem Relation Age of Onset   • Breast cancer Mother 52   • Ovarian cancer Neg Hx        PHYSICAL EXAM:    /62   Pulse 87   Temp 98 °F (36.7 °C)   Resp 16   Ht 167.6 cm (66\")   Wt 85.7 kg (189 lb)   SpO2 97%   BMI 30.51 kg/m²     ECOG: (0) Fully Active - Able to Carry On All Pre-disease Performance Without Restriction  General: well appearing female in no acute distress  HEENT: sclera anicteric, oropharynx clear  Lymphatics: no cervical, supraclavicular, inguinal, or axillary adenopathy  Cardiovascular: regular rate and rhythm, no murmurs  Neck: Supple; No thyromegaly  Lungs: clear to auscultation bilaterally. No respiratory distress.   Abdomen: soft, nontender, nondistended.  No palpable organomegaly  Extremities: no cyanosis, clubbing, edema, or cords  Skin: no rashes, lesions, bruising, or petechiae  Neuro: Alert and oriented x 4; Moving all extremities.  Psych: No anxiety or depression    Lab Results   Component Value Date    HGB " 14.6 09/23/2020    HCT 44.6 09/23/2020    MCV 89 09/23/2020     09/23/2020    WBC 13.7 (H) 09/23/2020    NEUTROABS 8.3 (H) 09/23/2020    LYMPHSABS 4.5 (H) 09/23/2020    MONOSABS 0.7 09/23/2020    EOSABS 0.1 09/23/2020    BASOSABS 0.0 09/23/2020     Lab Results   Component Value Date    GLUCOSE 84 11/19/2014    BUN 12 09/23/2020    CREATININE 0.85 09/23/2020     09/23/2020    K 4.2 09/23/2020     09/23/2020    CO2 28 09/23/2020    CALCIUM 9.7 09/23/2020    PROTEINTOT 7.4 11/19/2014    ALBUMIN 4.4 09/23/2020    BILITOT 0.4 09/23/2020    ALKPHOS 85 09/23/2020    AST 21 09/23/2020    ALT 37 (H) 09/23/2020           Assessment/Plan   1.  Chronic leukocytosis  2.  Anxiety depression  3.  Reflux with history of H. pylori positivity  4.  Questionable psoriatic arthritis with presumably falsely positive FARIBA and rheumatoid arthritis rheumatoid factor positive  5.  Migraines  6.  Dyslipidemia  7.  Diabetes  8.  Polycystic ovaries   9.  History of renal calculi  10.  History of pulmonary nodules with unremarkable CT per Dr. Goodwin December 2015  11.  Cholecystectomy for cholecystitis by Dr. epps 12/14/2017  12.  Atopic dermatitis  13.  Exogenous obesity  14.  Mother had breast cancer and pancreatic cancer (patient of mine)    Hematology history:  -8/10/2015 initial [facility] hematology consultation:referred to our office for further evaluation and recommendations in  regard to her leukocytosis. The patient states that for about the past 9 months  or so she has been feeling extremely fatigued to the point that it is  interfering with her daily life. She states that she was noted to have had  slightly elevated white blood cell count on a routine lab work initially  several months ago but at that time had attributed the slight elevation to a  possible sinus infection. She states that on further evaluation in regard to  her ongoing fatigue, more blood work was drawn and she was noted to have an  elevated white  "count that continued. Recent lab work includes CBC on 08/10/2015  with WBC 11.9, hemoglobin 13.2, hematocrit 38.8%, platelet count 343,000.  Differential was within normal limits other than for absolute lymphocytes  slightly elevated at 5.1.  Previous CBCs include 07/07/2015: WBC 13.9, normal  hemoglobin 13.5, hematocrit 40.8%, platelets slightly elevated at 398,000 and  differential within normal limits other than for absolute neutrophils of 8.2  and absolute lymphocytes of 5.0. At that same time, sedimentation rate was  normal at 6, T3 normal at 2.7, vitamin D 25-hydroxy was normal at 31.2.      The patient denies any fevers. She does report diffuse joint pain that is worse  in her right wrist and index finger. She also had some tenderness and slight  discomfort in her breast that seems to occur around the time of her menstrual  cycle. She has dry mouth and occasionally will notice a small ulceration in the  inner part of cheek. She also states that she has a nasal ulceration that seems  to \"come and go\".  She has not noted any heart palpitations or chest pain. She  has no shortness of breath other than for the feelings of shortness of breath  that seem to come with extreme fatigue. She has occasional swelling of her feet  that waxes and wanes. She has a history of migraine headaches. She feels  generalized weakness. She reports occasional numbness and tingling in her  hands. She reports a 15 pound weight gain over the past 10 months. She states  that she has chronic constipation, but denies any hematochezia or melena.     I suspect this is a reactive leukocytosis.  The odds of chronic myelogenous leukemia at this young age is very unlikely. We  will check her sedimentation rate and C-reactive protein again, the latter of  which had been a little elevated she says but the sedimentation rate has never  been abnormal thus far. We will also check her serum immunoelectrophoresis. We  will look at her peripheral smear " for immature forms. Assuming all this is  negative, we planned to draw at that junction her BCR/ABL gene rearrangement quantitative PCR but I think the odds of that at this young age being chronic myelogenous leukemia is very unlikely and I suspect she has some form of low level  autoimmune disease with the positive FARIBA that is causing her myalgias,  arthralgias, and fatigue with joint stiffness when she gets up, better when she  gets moving, and some pain in her calcaneus, worse on the right. This could  also be psoriatic based on my examination but I defer of course to the  expertise of Dr. Stevenson Umana.      -8/10/2015 data showed serum immunoelectrophoresis showed polyclonal gammopathy consistent with inflammation.  Peripheral smear showed slight left shift with normal morphology otherwise.  White count was down to normal at 10,580.  C-reactive protein elevated at 7.58 with sedimentation rate of 26.    -8/19/2015 hematology follow-up visit: I reviewed her elevated sedimentation rate and C-reactive protein in the face of rheumatoid factor positive rheumatoid arthritis and psoriatic arthritis followed by Stevenson Umana and given her young age did not recommend BCR abl gene rearrangement testing given low likelihood for myeloproliferative disorder in the face of what is clearly an inflammatory process and to follow-up with rheumatology and primary care.  -12/12/2017 EGD with Dr. Selby on duodenal biopsy showed minimal chronic gastritis without metaplasia dysplasia or malignancy and colonoscopy was negative save for a hyperplastic polyp.  He ruled her out for celiac disease but her white count was 12,700 with a C-reactive protein still elevated at 14.6.  Strongyloidiasis negative.  -5/2/2019 white count normal 7700 with normal CBC, ALT 33, AST 42, sedimentation rate 19.  C-reactive protein 27 upper limit of normal 8.  Normal B12 and folate.  -7/12/2019 saw Dr. Akil Escobar at [facility] rheumatology who  thought FARIBA was falsely positive.  He did not appreciate any obvious connective tissue disease but felt she had primary fibromyalgia syndrome.  He could not discern any systemic inflammatory symptoms.  He did not agree with her prior diagnosis of psoriatic arthritis and did not want her taking methotrexate.  Eyelid abnormalities he thought from eczema or dermatitis and suggested follow-up with dermatology.  She did have a few psoriatic patches in her skin but without clinical evidence of psoriatic arthritis  -9/30/2019 C-reactive protein 15.2 upper limit of normal 8 with sedimentation rate 14 and normal liver enzymes.  FARIBA +1: 80 homogeneous.Placed her on Cymbalta.  -1/24/2020 CT abdomen pelvis with contrast [facility] showed hepato-steatosis, stable 3 mm lung nodule benign, punctate renal stones, increasing fatty atrophy of the pancreatic head.  Done for evaluation of elevated liver enzymes and A1c.  -2/27/2020 MRCP [facility] showed mild fatty atrophy pancreatic head with no solid or cystic pancreatic lesions.  Endoscopic ultrasound showed mild erythema gastric antrum.  Had fatty infiltration of the pancreas diffusely and fatty liver infiltration.  No significant pathology otherwise.  Based on if pancreatic fluid bicarbonate less than 80 mmol/L than that would be pancreatic insufficiency but if over 80 mmol/L than they would consider normal pancreatic function.  -9/23/2020 white count 13,700 with unremarkable differential and no immature forms with CMP unremarkable save for ALT slightly elevated at 37 upper limit 32 and negative hepatitis C antibody.  Primary care referred back again for the same reason.  -10/16/2020 [facility] hematology consultation: Patient returned again for same issue of chronic leukocytosis.  Now 5 years later with similar level of leukocytosis as to 5 years previous.  We will repeat her CBC, peripheral smear, CMP, urinalysis, sedimentation rate, C-reactive protein, and  given all of the questions and despite my low suspicions for there being anything going on here given my prior statements and yet patient returning for same issue, I will check peripheral blood flow cytometry for circulating clone and BCR abl gene rearrangement which would be exceedingly unlikely to appear at the age of 35 and actually younger since this been going on for quite some time.  The white count is almost assuredly related to the multiplicity of potential inflammatory causes as outlined above and unlikely to be pathological in and of itself.  There have never been significant nucleated red cells, immature forms, fragmented red cells or other cytopenias to go along with this.  She is on no medications that should routinely elevate the white count.  I doubt the white count has anything to do with a malignant process but with her mother's history I will get genetic counseling as her mother had pancreatic cancer come on quickly and did not have genetic testing results before her passing.  Discussed with patient face-to-face 45 minutes greater than 50% spent counseling.  We will do phone visit with her in a few weeks to go over the results of these tests.        Sheldon Freed MD    10/16/2020

## 2020-10-17 LAB
CYTOLOGIST CVX/VAG CYTO: NORMAL
PATH INTERP BLD-IMP: NORMAL

## 2020-10-21 LAB — REF LAB TEST METHOD: NORMAL

## 2020-10-24 LAB
INTERPRETATION: NORMAL
LAB DIRECTOR NAME PROVIDER: NORMAL
LABORATORY COMMENT REPORT: NORMAL
REF LAB TEST METHOD: NORMAL
T(ABL1,BCR)B2A2/CONTROL BLD/T: NORMAL %
T(ABL1,BCR)B3A2/CONTROL BLD/T: NORMAL %
T(ABL1,BCR)E1A2/CONTROL BLD/T: NORMAL %

## 2020-10-28 ENCOUNTER — TELEPHONE (OUTPATIENT)
Dept: ENDOCRINOLOGY | Facility: CLINIC | Age: 35
End: 2020-10-28

## 2020-10-28 RX ORDER — FLUCONAZOLE 150 MG/1
150 TABLET ORAL ONCE
Qty: 1 TABLET | Refills: 5 | Status: SHIPPED | OUTPATIENT
Start: 2020-10-28 | End: 2020-10-28

## 2020-10-28 NOTE — TELEPHONE ENCOUNTER
Pt said she has been having chronic Yeast infections and her OBGYN told her it may be from her Jardiance medciation.She would like consultation on that.    [phone]

## 2020-10-31 DIAGNOSIS — F41.8 DEPRESSION WITH ANXIETY: ICD-10-CM

## 2020-11-02 RX ORDER — DULOXETIN HYDROCHLORIDE 20 MG/1
20 CAPSULE, DELAYED RELEASE ORAL DAILY
Qty: 30 CAPSULE | Refills: 0 | Status: SHIPPED | OUTPATIENT
Start: 2020-11-02 | End: 2020-12-07

## 2020-11-03 ENCOUNTER — CLINICAL SUPPORT (OUTPATIENT)
Dept: GENETICS | Facility: HOSPITAL | Age: 35
End: 2020-11-03

## 2020-11-03 DIAGNOSIS — Z80.3 FAMILY HISTORY OF BREAST CANCER: ICD-10-CM

## 2020-11-03 DIAGNOSIS — Z13.79 GENETIC TESTING: Primary | ICD-10-CM

## 2020-11-03 DIAGNOSIS — Z80.0 FAMILY HISTORY OF PANCREATIC CANCER: ICD-10-CM

## 2020-11-03 RX ORDER — EMPAGLIFLOZIN 10 MG/1
1 TABLET, FILM COATED ORAL DAILY
Qty: 30 TABLET | Refills: 5 | Status: SHIPPED | OUTPATIENT
Start: 2020-11-03 | End: 2021-01-11

## 2020-11-04 NOTE — PROGRESS NOTES
Anita George, a 35-year-old female, was referred for genetic counseling due to a family history of cancer. Genetic counseling was completed by telephone consult. She was 10 years old at menarche and had her first child at age 23. She retains her uterus and ovaries. 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 Her current cancer screening includes annual clinical breast exam and annual mammogram. Ms. George was interested in discussing her risk for a hereditary cancer syndrome. Ms. George was interested in pursuing a multi-gene panel, and therefore the CancerNext panel was ordered through [employer] which analyzes 36 genes associated with an increased cancer risk.     FAMILY HISTORY (see attached pedigree):    Mother:   Breast cancer, 51; Pancreatic cancer, 61  Mat. Uncle:   Non-Hodgkin Lymphoma, 10  Mat. Grandfather:  Non-Hodgkin Lymphoma, 35  Mat. Great Grandmother: Colon cancer  Mat. Great Grand Aunt: Pancreatic cancer  Pat. Grandmother:  Unspecified cancer type (metastatic), 72  Pat. Grandfather:  Stomach and colon cancer, 81  Pat. Great Grandfather: Liver cancer    We do not have medical records regarding the diagnoses in Ms. George’s family.    RISK ASSESSMENT: Ms. George’s family history led to concern regarding a hereditary cancer syndrome. She clearly meets NCCN guidelines criteria for BRCA1/2 testing based on her family history of breast and pancreatic cancer. In cases where an affected relative is not available for testing or not willing to pursue testing, it is appropriate to offer testing to an unaffected individual. Ms. George opted to pursue multigene panel testing via the CancerNext panel. If genetic testing is negative, Ms. George’s management should be guided by family history. These risk assessments are based on the family history information provided at the time of the appointment and could change in the future should new information be obtained.    GENETIC COUNSELING (30 minutes):  We  reviewed the family history information in detail. Cases of cancer follow three general patterns: sporadic, familial, and hereditary.  While most cancer is sporadic, some cases appear to occur in family clusters.  These cases are said to be familial and account for 10-20% of cancer cases.  Familial cases may be due to a combination of shared genes and environmental factors among family members.  In even fewer families, the cancer is said to be inherited, and the genes responsible for the cancer are known.      Family histories typical of hereditary cancer syndromes usually include multiple first- and second-degree relatives diagnosed with cancer types that define a syndrome. These cases tend to be diagnosed at younger-than-expected ages and can be bilateral or multifocal. The cancer in these families follows an autosomal dominant inheritance pattern, which indicates the likely presence of a mutation in a cancer susceptibility gene. Children and siblings of an individual believed to carry this mutation have a 50% chance of inheriting that mutation, thereby inheriting the increased risk to develop cancer. These mutations can be passed down from the maternal or the paternal lineage.    Based on Ms. George’s family history, we discussed that hereditary breast cancer accounts for 5-10% of all cases.  A significant proportion of hereditary breast cancer can be attributed to mutations in the BRCA1 and BRCA2 genes.  Mutations in these genes confer an increased risk for breast cancer, ovarian cancer, male breast cancer, prostate cancer, and pancreatic cancer.  Women with a BRCA1 or BRCA2 mutation have up to an 87% lifetime risk of breast cancer and up to a 44% risk of ovarian cancer.    The most common hereditary condition associated with an increased risk for colon cancer is Dos Santos syndrome. The lifetime risk for colon cancer for individuals with Dos Santos syndrome is approximately 80% if there is no intervention (i.e. removal  of polyps detected on colonoscopy), and the risk for uterine cancer is as high as 60%.  Other risks include ovarian, upper GI, brain, stomach, pancreatic. There are national management guidelines that have been established for individuals known to have Dos Santos syndrome.      Some of the genes that will be evaluated on this multigene panel have well defined cancer risks and established management guidelines.  Other genes that can be tested for have been more recently described, and there may be less data regarding the risks and therefore may not have established management guidelines.  We discussed these limitations at length. Based on Ms. George’s family history of cancer and her desire to get more information regarding her personal risks she opted to pursue testing through a panel evaluating several other genes known to increase the risk for cancer.    GENETIC TESTING:  The risks, benefits and limitations of genetic testing and implications for clinical management following testing were reviewed. DNA test results can influence decisions regarding screening and prevention.  Genetic testing can have significant psychological implications for both individuals and families. Also discussed was the possibility of employment and insurance discrimination based on genetic test results and the federal and states laws that are in place to prevent this ([place]), as well as the limitations of these laws.         We discussed panel testing, which would involve testing 36 genes associated with increased cancer risk. The benefits and limitations of genetic testing were discussed. The implications of a positive or negative test result were discussed. We also discussed the importance of testing on an affected relative. We discussed the possibility that, in some cases, genetic test results may be ambiguous due to the identification of a genetic variant. These variants may or may not be associated with an increased cancer risk. With  multigene panel testing, it is not uncommon for a variant of uncertain significance (VUS) to be identified.  If a VUS is identified, testing family members is not recommended and screening recommendations are made based on the family history. The laboratories that perform genetic testing work to reclassify the VUS and send out an amended report if and when a VUS is reclassified.  The majority of variant findings are ultimately reclassified to a negative result. Given her family history, a negative test result does not eliminate all cancer risk, although the risk would not be as high as it would with positive genetic testing.     PLAN:  Genetic testing via the CancerNext panel through "[employer] was ordered. A saliva kit is being mailed to Ms. George’s home address. Results are expected 2-3 weeks after the testing lab receives her sample.      Charity Bowden MS, [facility] [place], [facility]  Licensed Certified Genetic Counselor

## 2020-11-23 ENCOUNTER — OFFICE VISIT (OUTPATIENT)
Dept: ONCOLOGY | Facility: CLINIC | Age: 35
End: 2020-11-23

## 2020-11-23 VITALS — HEIGHT: 66 IN | WEIGHT: 195 LBS | BODY MASS INDEX: 31.34 KG/M2

## 2020-11-23 DIAGNOSIS — D72.829 LEUKOCYTOSIS, UNSPECIFIED TYPE: Primary | ICD-10-CM

## 2020-11-23 PROCEDURE — 99443 PR PHYS/QHP TELEPHONE EVALUATION 21-30 MIN: CPT | Performed by: INTERNAL MEDICINE

## 2020-11-23 NOTE — PROGRESS NOTES
Telehealth follow-up visit:  This visit has been rescheduled as a phone visit to comply with patient safety concerns in accordance with CDC recommendations. Total time of discussion was 25 minutes.  Verbal consent given    CHIEF COMPLAINT: Generalized fatigue    Problem List:  Oncology/Hematology History Overview Note   1.  Chronic leukocytosis  2.  Anxiety depression  3.  Reflux with history of H. pylori positivity  4.  Questionable psoriatic arthritis with presumably falsely positive FARIBA and rheumatoid arthritis rheumatoid factor positive  5.  Migraines  6.  Dyslipidemia  7.  Diabetes  8.  Polycystic ovaries   9.  History of renal calculi  10.  History of pulmonary nodules with unremarkable CT per Dr. Goodwin December 2015  11.  Cholecystectomy for cholecystitis by Dr. epps 12/14/2017  12.  Atopic dermatitis  13.  Exogenous obesity  14.  Mother had breast cancer and pancreatic cancer (patient of mine)    Hematology history:  -8/10/2015 initial [facility] consultation:referred to our office for further evaluation and recommendations in  regard to her leukocytosis. The patient states that for about the past 9 months  or so she has been feeling extremely fatigued to the point that it is  interfering with her daily life. She states that she was noted to have had  slightly elevated white blood cell count on a routine lab work initially  several months ago but at that time had attributed the slight elevation to a  possible sinus infection. She states that on further evaluation in regard to  her ongoing fatigue, more blood work was drawn and she was noted to have an  elevated white count that continued. Recent lab work includes CBC on 08/10/2015  with WBC 11.9, hemoglobin 13.2, hematocrit 38.8%, platelet count 343,000.  Differential was within normal limits other than for absolute lymphocytes  slightly elevated at 5.1.  Previous CBCs include 07/07/2015: WBC 13.9, normal  hemoglobin 13.5, hematocrit 40.8%,  "platelets slightly elevated at 398,000 and  differential within normal limits other than for absolute neutrophils of 8.2  and absolute lymphocytes of 5.0. At that same time, sedimentation rate was  normal at 6, T3 normal at 2.7, vitamin D 25-hydroxy was normal at 31.2.      The patient denies any fevers. She does report diffuse joint pain that is worse  in her right wrist and index finger. She also had some tenderness and slight  discomfort in her breast that seems to occur around the time of her menstrual  cycle. She has dry mouth and occasionally will notice a small ulceration in the  inner part of cheek. She also states that she has a nasal ulceration that seems  to \"come and go\".  She has not noted any heart palpitations or chest pain. She  has no shortness of breath other than for the feelings of shortness of breath  that seem to come with extreme fatigue. She has occasional swelling of her feet  that waxes and wanes. She has a history of migraine headaches. She feels  generalized weakness. She reports occasional numbness and tingling in her  hands. She reports a 15 pound weight gain over the past 10 months. She states  that she has chronic constipation, but denies any hematochezia or melena.     I suspect this is a reactive leukocytosis.  The odds of chronic myelogenous leukemia at this young age is very unlikely. We  will check her sedimentation rate and C-reactive protein again, the latter of  which had been a little elevated she says but the sedimentation rate has never  been abnormal thus far. We will also check her serum immunoelectrophoresis. We  will look at her peripheral smear for immature forms. Assuming all this is  negative, we planned to draw at that junction her BCR/ABL gene rearrangement quantitative PCR but I think the odds of that at this young age being chronic myelogenous leukemia is very unlikely and I suspect she has some form of low level  autoimmune disease with the positive FARIBA that is " causing her myalgias,  arthralgias, and fatigue with joint stiffness when she gets up, better when she  gets moving, and some pain in her calcaneus, worse on the right. This could  also be psoriatic based on my examination but I defer of course to the  expertise of Dr. Stevenson Umana.      -8/10/2015 data showed serum immunoelectrophoresis showed polyclonal gammopathy consistent with inflammation.  Peripheral smear showed slight left shift with normal morphology otherwise.  White count was down to normal at 10,580.  C-reactive protein elevated at 7.58 with sedimentation rate of 26.    -8/19/2015 hematology follow-up visit: I reviewed her elevated sedimentation rate and C-reactive protein in the face of rheumatoid factor positive rheumatoid arthritis and psoriatic arthritis followed by Stevenson Umana and given her young age did not recommend BCR abl gene rearrangement testing given low likelihood for myeloproliferative disorder in the face of what is clearly an inflammatory process and to follow-up with rheumatology and primary care.  -12/12/2017 EGD with Dr. Selby on duodenal biopsy showed minimal chronic gastritis without metaplasia dysplasia or malignancy and colonoscopy was negative save for a hyperplastic polyp.  He ruled her out for celiac disease but her white count was 12,700 with a C-reactive protein still elevated at 14.6.  Strongyloidiasis negative.  -5/2/2019 white count normal 7700 with normal CBC, ALT 33, AST 42, sedimentation rate 19.  C-reactive protein 27 upper limit of normal 8.  Normal B12 and folate.  -7/12/2019 saw Dr. Akil Escobar at [facility] rheumatology who thought FARIBA was falsely positive.  He did not appreciate any obvious connective tissue disease but felt she had primary fibromyalgia syndrome.  He could not discern any systemic inflammatory symptoms.  He did not agree with her prior diagnosis of psoriatic arthritis and did not want her taking methotrexate.  Eyelid  abnormalities he thought from eczema or dermatitis and suggested follow-up with dermatology.  She did have a few psoriatic patches in her skin but without clinical evidence of psoriatic arthritis  -9/30/2019 C-reactive protein 15.2 upper limit of normal 8 with sedimentation rate 14 and normal liver enzymes.  FARIBA +1: 80 homogeneous.Placed her on Cymbalta.  -1/24/2020 CT abdomen pelvis with contrast [facility] showed hepato-steatosis, stable 3 mm lung nodule benign, punctate renal stones, increasing fatty atrophy of the pancreatic head.  Done for evaluation of elevated liver enzymes and A1c.  -2/27/2020 [facility]CP [facility] showed mild fatty atrophy pancreatic head with no solid or cystic pancreatic lesions.  Endoscopic ultrasound showed mild erythema gastric antrum.  Had fatty infiltration of the pancreas diffusely and fatty liver infiltration.  No significant pathology otherwise.  Based on if pancreatic fluid bicarbonate less than 80 mmol/L than that would be pancreatic insufficiency but if over 80 mmol/L than they would consider normal pancreatic function.  -9/23/2020 white count 13,700 with unremarkable differential and no immature forms with CMP unremarkable save for ALT slightly elevated at 37 upper limit 32 and negative hepatitis C antibody.  Primary care referred back again for the same reason.  -10/16/2020 [facility] hematology consultation: Patient returned again for same issue of chronic leukocytosis.  Now 5 years later with similar level of leukocytosis as to 5 years previous.  We will repeat her CBC, peripheral smear, CMP, urinalysis, sedimentation rate, C-reactive protein, and given all of the questions and despite my low suspicions for there being anything going on here given my prior statements and yet patient returning for same issue, I will check peripheral blood flow cytometry for circulating clone and BCR abl gene rearrangement which would be exceedingly unlikely to appear at the  age of 35 and actually younger since this been going on for quite some time.  The white count is almost assuredly related to the multiplicity of potential inflammatory causes as outlined above and unlikely to be pathological in and of itself.  There have never been significant nucleated red cells, immature forms, fragmented red cells or other cytopenias to go along with this.  She is on no medications that should routinely elevate the white count.  I doubt the white count has anything to do with a malignant process but with her mother's history I will get genetic counseling as her mother had pancreatic cancer come on quickly and did not have genetic testing results before her passing.  -11/23/2020 [facility] hematology follow-up visit: I reviewed her 10/16/2020 data:  Hemoglobin 14.4 with white count 14,330 and platelets 473,000.  Sedimentation rate 21 upper limit of normal 20 so barely elevated with a normal C-reactive protein 0.12.  Peripheral smear showed mild leukocytosis with no left shift, no basophilia, and no blasts.  Peripheral blood flow cytometry showed no significant immunophenotypic abnormality.  BCR abl gene rearrangement was negative.  CMP was entirely normal including liver enzymes, creatinine, total protein, calcium.  Hence, despite the lack of clear-cut inflammatory markers with normal C-reactive protein and barely elevated sedimentation rate, I doubt that this is a myeloproliferative disorder present at the age of 35 and a prior CT 5 years ago of the chest that included upper abdomen showing normal spleen and liver, hence no corroborating evidence to suggest hepatosplenomegaly that might be associated with myeloproliferative disorders.  Cancer next panel was ordered by our genetic counselor, Chely Bowden.  This was done on 11/3/2020 but results are still pending.  She has been off of methotrexate or autoimmune directed therapies for over 5 years.  My suspicions for underlying malignant condition in  "the bone marrow is very low but given that she has now come to me twice for the same question I will, for completeness sake, get CT-guided bone marrow biopsy and by the time we do virtual visit in 1 month we should have that result as well as the genetic counseling results.         Leucocytosis       HISTORY OF PRESENT ILLNESS:  The patient is a 35 y.o. female, here for follow up on management of chronic leukocytosis and thrombocytosis with generalized fatigue      History reviewed. No pertinent past medical history.  History reviewed. No pertinent surgical history.    No Known Allergies    Family History and Social History reviewed and changed as necessary      REVIEW OF SYSTEM:   Review of Systems   Constitutional: Negative for appetite change, chills, diaphoresis, fatigue, fever and unexpected weight change.   HENT:   Negative for mouth sores, sore throat and trouble swallowing.    Eyes: Negative for icterus.   Respiratory: Negative for cough, hemoptysis and shortness of breath.    Cardiovascular: Negative for chest pain, leg swelling and palpitations.   Gastrointestinal: Negative for abdominal distention, abdominal pain, blood in stool, constipation, diarrhea, nausea and vomiting.   Endocrine: Negative for hot flashes.   Genitourinary: Negative for bladder incontinence, difficulty urinating, dysuria, frequency and hematuria.    Musculoskeletal: Negative for gait problem, neck pain and neck stiffness.   Skin: Negative for rash.   Neurological: Negative for dizziness, gait problem, headaches, light-headedness and numbness.   Hematological: Negative for adenopathy. Does not bruise/bleed easily.   Psychiatric/Behavioral: Negative for depression. The patient is not nervous/anxious.    All other systems reviewed and are negative.       PHYSICAL EXAM    Vitals:    11/23/20 0848   Weight: 88.5 kg (195 lb)   Height: 167.6 cm (66\")     Vitals:    11/23/20 0848   PainSc: 0-No pain        Constitutional: Appears " well-developed and well-nourished. No distress.   Phone visit      Lab Results   Component Value Date    HGB 14.4 10/16/2020    HCT 43.7 10/16/2020    MCV 88.3 10/16/2020     (H) 10/16/2020    WBC 14.33 (H) 10/16/2020    NEUTROABS 6.59 10/16/2020    LYMPHSABS 4.5 (H) 09/23/2020    MONOSABS 0.7 09/23/2020    EOSABS 0.29 10/16/2020    BASOSABS 0.00 10/16/2020       Lab Results   Component Value Date    GLUCOSE 77 10/16/2020    BUN 14 10/16/2020    CREATININE 0.75 10/16/2020     10/16/2020    K 3.7 10/16/2020     10/16/2020    CO2 29.0 10/16/2020    CALCIUM 9.4 10/16/2020    PROTEINTOT 7.3 10/16/2020    ALBUMIN 4.60 10/16/2020    BILITOT 0.5 10/16/2020    ALKPHOS 79 10/16/2020    AST 22 10/16/2020    ALT 29 10/16/2020                   ASSESSMENT & PLAN:  1.  Chronic leukocytosis  2.  Anxiety depression  3.  Reflux with history of H. pylori positivity  4.  Questionable psoriatic arthritis with presumably falsely positive FARIBA and rheumatoid arthritis rheumatoid factor positive  5.  Migraines  6.  Dyslipidemia  7.  Diabetes  8.  Polycystic ovaries   9.  History of renal calculi  10.  History of pulmonary nodules with unremarkable CT per Dr. Goodwin December 2015  11.  Cholecystectomy for cholecystitis by Dr. epps 12/14/2017  12.  Atopic dermatitis  13.  Exogenous obesity  14.  Mother had breast cancer and pancreatic cancer (patient of mine)    Hematology history:  -8/10/2015 initial [facility] hematology consultation:referred to our office for further evaluation and recommendations in  regard to her leukocytosis. The patient states that for about the past 9 months  or so she has been feeling extremely fatigued to the point that it is  interfering with her daily life. She states that she was noted to have had  slightly elevated white blood cell count on a routine lab work initially  several months ago but at that time had attributed the slight elevation to a  possible sinus infection. She states that on  "further evaluation in regard to  her ongoing fatigue, more blood work was drawn and she was noted to have an  elevated white count that continued. Recent lab work includes CBC on 08/10/2015  with WBC 11.9, hemoglobin 13.2, hematocrit 38.8%, platelet count 343,000.  Differential was within normal limits other than for absolute lymphocytes  slightly elevated at 5.1.  Previous CBCs include 07/07/2015: WBC 13.9, normal  hemoglobin 13.5, hematocrit 40.8%, platelets slightly elevated at 398,000 and  differential within normal limits other than for absolute neutrophils of 8.2  and absolute lymphocytes of 5.0. At that same time, sedimentation rate was  normal at 6, T3 normal at 2.7, vitamin D 25-hydroxy was normal at 31.2.      The patient denies any fevers. She does report diffuse joint pain that is worse  in her right wrist and index finger. She also had some tenderness and slight  discomfort in her breast that seems to occur around the time of her menstrual  cycle. She has dry mouth and occasionally will notice a small ulceration in the  inner part of cheek. She also states that she has a nasal ulceration that seems  to \"come and go\".  She has not noted any heart palpitations or chest pain. She  has no shortness of breath other than for the feelings of shortness of breath  that seem to come with extreme fatigue. She has occasional swelling of her feet  that waxes and wanes. She has a history of migraine headaches. She feels  generalized weakness. She reports occasional numbness and tingling in her  hands. She reports a 15 pound weight gain over the past 10 months. She states  that she has chronic constipation, but denies any hematochezia or melena.     I suspect this is a reactive leukocytosis.  The odds of chronic myelogenous leukemia at this young age is very unlikely. We  will check her sedimentation rate and C-reactive protein again, the latter of  which had been a little elevated she says but the sedimentation rate " has never  been abnormal thus far. We will also check her serum immunoelectrophoresis. We  will look at her peripheral smear for immature forms. Assuming all this is  negative, we planned to draw at that junction her BCR/ABL gene rearrangement quantitative PCR but I think the odds of that at this young age being chronic myelogenous leukemia is very unlikely and I suspect she has some form of low level  autoimmune disease with the positive FARIBA that is causing her myalgias,  arthralgias, and fatigue with joint stiffness when she gets up, better when she  gets moving, and some pain in her calcaneus, worse on the right. This could  also be psoriatic based on my examination but I defer of course to the  expertise of Dr. Stevenson Umana.      -8/10/2015 data showed serum immunoelectrophoresis showed polyclonal gammopathy consistent with inflammation.  Peripheral smear showed slight left shift with normal morphology otherwise.  White count was down to normal at 10,580.  C-reactive protein elevated at 7.58 with sedimentation rate of 26.    -8/19/2015 hematology follow-up visit: I reviewed her elevated sedimentation rate and C-reactive protein in the face of rheumatoid factor positive rheumatoid arthritis and psoriatic arthritis followed by Stevenson Umana and given her young age did not recommend BCR abl gene rearrangement testing given low likelihood for myeloproliferative disorder in the face of what is clearly an inflammatory process and to follow-up with rheumatology and primary care.  -12/12/2017 EGD with Dr. Selby on duodenal biopsy showed minimal chronic gastritis without metaplasia dysplasia or malignancy and colonoscopy was negative save for a hyperplastic polyp.  He ruled her out for celiac disease but her white count was 12,700 with a C-reactive protein still elevated at 14.6.  Strongyloidiasis negative.  -5/2/2019 white count normal 7700 with normal CBC, ALT 33, AST 42, sedimentation rate 19.  C-reactive  protein 27 upper limit of normal 8.  Normal B12 and folate.  -7/12/2019 saw Dr. Akil Escobar at [facility] rheumatology who thought FARIBA was falsely positive.  He did not appreciate any obvious connective tissue disease but felt she had primary fibromyalgia syndrome.  He could not discern any systemic inflammatory symptoms.  He did not agree with her prior diagnosis of psoriatic arthritis and did not want her taking methotrexate.  Eyelid abnormalities he thought from eczema or dermatitis and suggested follow-up with dermatology.  She did have a few psoriatic patches in her skin but without clinical evidence of psoriatic arthritis  -9/30/2019 C-reactive protein 15.2 upper limit of normal 8 with sedimentation rate 14 and normal liver enzymes.  FARIBA +1: 80 homogeneous.Placed her on Cymbalta.  -1/24/2020 CT abdomen pelvis with contrast [facility] showed hepato-steatosis, stable 3 mm lung nodule benign, punctate renal stones, increasing fatty atrophy of the pancreatic head.  Done for evaluation of elevated liver enzymes and A1c.  -2/27/2020 MRCP [facility] showed mild fatty atrophy pancreatic head with no solid or cystic pancreatic lesions.  Endoscopic ultrasound showed mild erythema gastric antrum.  Had fatty infiltration of the pancreas diffusely and fatty liver infiltration.  No significant pathology otherwise.  Based on if pancreatic fluid bicarbonate less than 80 mmol/L than that would be pancreatic insufficiency but if over 80 mmol/L than they would consider normal pancreatic function.  -9/23/2020 white count 13,700 with unremarkable differential and no immature forms with CMP unremarkable save for ALT slightly elevated at 37 upper limit 32 and negative hepatitis C antibody.  Primary care referred back again for the same reason.  -10/16/2020 [facility] hematology consultation: Patient returned again for same issue of chronic leukocytosis.  Now 5 years later with similar level of  leukocytosis as to 5 years previous.  We will repeat her CBC, peripheral smear, CMP, urinalysis, sedimentation rate, C-reactive protein, and given all of the questions and despite my low suspicions for there being anything going on here given my prior statements and yet patient returning for same issue, I will check peripheral blood flow cytometry for circulating clone and BCR abl gene rearrangement which would be exceedingly unlikely to appear at the age of 35 and actually younger since this been going on for quite some time.  The white count is almost assuredly related to the multiplicity of potential inflammatory causes as outlined above and unlikely to be pathological in and of itself.  There have never been significant nucleated red cells, immature forms, fragmented red cells or other cytopenias to go along with this.  She is on no medications that should routinely elevate the white count.  I doubt the white count has anything to do with a malignant process but with her mother's history I will get genetic counseling as her mother had pancreatic cancer come on quickly and did not have genetic testing results before her passing.  -11/23/2020 [facility] hematology follow-up visit: I reviewed her 10/16/2020 data:  Hemoglobin 14.4 with white count 14,330 and platelets 473,000.  Sedimentation rate 21 upper limit of normal 20 so barely elevated with a normal C-reactive protein 0.12.  Peripheral smear showed mild leukocytosis with no left shift, no basophilia, and no blasts.  Peripheral blood flow cytometry showed no significant immunophenotypic abnormality.  BCR abl gene rearrangement was negative.  CMP was entirely normal including liver enzymes, creatinine, total protein, calcium.  Hence, despite the lack of clear-cut inflammatory markers with normal C-reactive protein and barely elevated sedimentation rate, I doubt that this is a myeloproliferative disorder present at the age of 35 and a prior CT 5 years ago of the  chest that included upper abdomen showing normal spleen and liver, hence no corroborating evidence to suggest hepatosplenomegaly that might be associated with myeloproliferative disorders.  Cancer next panel was ordered by our genetic counselor, Chely Bowden.  This was done on 11/3/2020 but results are still pending.  She has been off of methotrexate or autoimmune directed therapies for over 5 years.  My suspicions for underlying malignant condition in the bone marrow is very low but given that she has now come to me twice for the same question I will, for completeness sake, get CT-guided bone marrow biopsy and by the time we do virtual visit in 1 month we should have that result as well as the genetic counseling results.          Sheldon Freed MD

## 2020-11-25 ENCOUNTER — TELEPHONE (OUTPATIENT)
Dept: ONCOLOGY | Facility: CLINIC | Age: 35
End: 2020-11-25

## 2020-11-25 NOTE — TELEPHONE ENCOUNTER
PT: SELF    PT CALLING TO SEE WHO SCHEDULES HER BONE MARROW BIOPSY AND WHEN WILL IT BE SCHEDULE, PLEASE CALL HER TODAY IF POSSIBLE. PLEASE ADVISE    PT #: [phone]

## 2020-12-05 DIAGNOSIS — F41.8 DEPRESSION WITH ANXIETY: ICD-10-CM

## 2020-12-07 ENCOUNTER — APPOINTMENT (OUTPATIENT)
Dept: PREADMISSION TESTING | Facility: HOSPITAL | Age: 35
End: 2020-12-07

## 2020-12-07 PROCEDURE — U0004 COV-19 TEST NON-CDC HGH THRU: HCPCS

## 2020-12-07 PROCEDURE — C9803 HOPD COVID-19 SPEC COLLECT: HCPCS

## 2020-12-07 RX ORDER — DULOXETIN HYDROCHLORIDE 20 MG/1
20 CAPSULE, DELAYED RELEASE ORAL DAILY
Qty: 30 CAPSULE | Refills: 1 | Status: SHIPPED | OUTPATIENT
Start: 2020-12-07 | End: 2021-01-11

## 2020-12-08 LAB — SARS-COV-2 RNA RESP QL NAA+PROBE: NOT DETECTED

## 2020-12-10 ENCOUNTER — HOSPITAL ENCOUNTER (OUTPATIENT)
Dept: CT IMAGING | Facility: HOSPITAL | Age: 35
Discharge: HOME | End: 2020-12-10
Admitting: INTERNAL MEDICINE

## 2020-12-10 VITALS
SYSTOLIC BLOOD PRESSURE: 104 MMHG | HEART RATE: 79 BPM | RESPIRATION RATE: 18 BRPM | HEIGHT: 66 IN | TEMPERATURE: 97.7 F | DIASTOLIC BLOOD PRESSURE: 69 MMHG | WEIGHT: 200.4 LBS | BODY MASS INDEX: 32.21 KG/M2 | OXYGEN SATURATION: 96 %

## 2020-12-10 DIAGNOSIS — D72.829 LEUKOCYTOSIS, UNSPECIFIED TYPE: ICD-10-CM

## 2020-12-10 LAB
APTT PPP: 35.8 SECONDS (ref 24–37)
BASOPHILS # BLD AUTO: 0.05 10*3/MM3 (ref 0–0.2)
BASOPHILS NFR BLD AUTO: 0.5 % (ref 0–1.5)
DEPRECATED RDW RBC AUTO: 38.5 FL (ref 37–54)
EOSINOPHIL # BLD AUTO: 0.22 10*3/MM3 (ref 0–0.4)
EOSINOPHIL NFR BLD AUTO: 2 % (ref 0.3–6.2)
ERYTHROCYTE [DISTWIDTH] IN BLOOD BY AUTOMATED COUNT: 11.9 % (ref 12.3–15.4)
GLUCOSE BLDC GLUCOMTR-MCNC: 125 MG/DL (ref 70–130)
HCT VFR BLD AUTO: 40.9 % (ref 34–46.6)
HGB BLD-MCNC: 13.8 G/DL (ref 12–15.9)
IMM GRANULOCYTES # BLD AUTO: 0.03 10*3/MM3 (ref 0–0.05)
IMM GRANULOCYTES NFR BLD AUTO: 0.3 % (ref 0–0.5)
INR PPP: 0.99 (ref 0.85–1.16)
LYMPHOCYTES # BLD AUTO: 4.52 10*3/MM3 (ref 0.7–3.1)
LYMPHOCYTES NFR BLD AUTO: 41.8 % (ref 19.6–45.3)
MCH RBC QN AUTO: 30 PG (ref 26.6–33)
MCHC RBC AUTO-ENTMCNC: 33.7 G/DL (ref 31.5–35.7)
MCV RBC AUTO: 88.9 FL (ref 79–97)
MONOCYTES # BLD AUTO: 0.57 10*3/MM3 (ref 0.1–0.9)
MONOCYTES NFR BLD AUTO: 5.3 % (ref 5–12)
NEUTROPHILS NFR BLD AUTO: 5.43 10*3/MM3 (ref 1.7–7)
NEUTROPHILS NFR BLD AUTO: 50.1 % (ref 42.7–76)
NRBC BLD AUTO-RTO: 0 /100 WBC (ref 0–0.2)
PLATELET # BLD AUTO: 383 10*3/MM3 (ref 140–450)
PMV BLD AUTO: 9.3 FL (ref 6–12)
PROTHROMBIN TIME: 12.7 SECONDS (ref 11.5–14)
RBC # BLD AUTO: 4.6 10*6/MM3 (ref 3.77–5.28)
WBC # BLD AUTO: 10.82 10*3/MM3 (ref 3.4–10.8)

## 2020-12-10 PROCEDURE — 82962 GLUCOSE BLOOD TEST: CPT

## 2020-12-10 PROCEDURE — 25010000003 LIDOCAINE 1 % SOLUTION: Performed by: RADIOLOGY

## 2020-12-10 PROCEDURE — 85025 COMPLETE CBC W/AUTO DIFF WBC: CPT | Performed by: RADIOLOGY

## 2020-12-10 PROCEDURE — 88341 IMHCHEM/IMCYTCHM EA ADD ANTB: CPT | Performed by: INTERNAL MEDICINE

## 2020-12-10 PROCEDURE — 25010000002 MIDAZOLAM PER 1 MG: Performed by: RADIOLOGY

## 2020-12-10 PROCEDURE — 85730 THROMBOPLASTIN TIME PARTIAL: CPT | Performed by: RADIOLOGY

## 2020-12-10 PROCEDURE — 77012 CT SCAN FOR NEEDLE BIOPSY: CPT

## 2020-12-10 PROCEDURE — 85610 PROTHROMBIN TIME: CPT | Performed by: RADIOLOGY

## 2020-12-10 PROCEDURE — 25010000002 FENTANYL CITRATE (PF) 100 MCG/2ML SOLUTION: Performed by: RADIOLOGY

## 2020-12-10 PROCEDURE — 88313 SPECIAL STAINS GROUP 2: CPT | Performed by: INTERNAL MEDICINE

## 2020-12-10 PROCEDURE — 85097 BONE MARROW INTERPRETATION: CPT | Performed by: INTERNAL MEDICINE

## 2020-12-10 PROCEDURE — 99152 MOD SED SAME PHYS/QHP 5/>YRS: CPT

## 2020-12-10 PROCEDURE — 88305 TISSUE EXAM BY PATHOLOGIST: CPT | Performed by: INTERNAL MEDICINE

## 2020-12-10 PROCEDURE — 88342 IMHCHEM/IMCYTCHM 1ST ANTB: CPT | Performed by: INTERNAL MEDICINE

## 2020-12-10 PROCEDURE — 88311 DECALCIFY TISSUE: CPT | Performed by: INTERNAL MEDICINE

## 2020-12-10 RX ORDER — FENTANYL CITRATE 50 UG/ML
INJECTION, SOLUTION INTRAMUSCULAR; INTRAVENOUS
Status: COMPLETED | OUTPATIENT
Start: 2020-12-10 | End: 2020-12-10

## 2020-12-10 RX ORDER — MIDAZOLAM HYDROCHLORIDE 1 MG/ML
INJECTION INTRAMUSCULAR; INTRAVENOUS
Status: DISPENSED
Start: 2020-12-10 | End: 2020-12-10

## 2020-12-10 RX ORDER — MIDAZOLAM HYDROCHLORIDE 1 MG/ML
INJECTION INTRAMUSCULAR; INTRAVENOUS
Status: COMPLETED | OUTPATIENT
Start: 2020-12-10 | End: 2020-12-10

## 2020-12-10 RX ORDER — SODIUM CHLORIDE 0.9 % (FLUSH) 0.9 %
3 SYRINGE (ML) INJECTION EVERY 12 HOURS SCHEDULED
Status: DISCONTINUED | OUTPATIENT
Start: 2020-12-10 | End: 2020-12-11 | Stop reason: HOSPADM

## 2020-12-10 RX ORDER — LIDOCAINE HYDROCHLORIDE 10 MG/ML
20 INJECTION, SOLUTION INFILTRATION; PERINEURAL ONCE
Status: COMPLETED | OUTPATIENT
Start: 2020-12-10 | End: 2020-12-10

## 2020-12-10 RX ORDER — SODIUM CHLORIDE 0.9 % (FLUSH) 0.9 %
10 SYRINGE (ML) INJECTION AS NEEDED
Status: DISCONTINUED | OUTPATIENT
Start: 2020-12-10 | End: 2020-12-11 | Stop reason: HOSPADM

## 2020-12-10 RX ORDER — FENTANYL CITRATE 50 UG/ML
INJECTION, SOLUTION INTRAMUSCULAR; INTRAVENOUS
Status: DISPENSED
Start: 2020-12-10 | End: 2020-12-10

## 2020-12-10 RX ADMIN — LIDOCAINE HYDROCHLORIDE 20 ML: 10 INJECTION, SOLUTION INFILTRATION; PERINEURAL at 08:52

## 2020-12-10 RX ADMIN — MIDAZOLAM 1 MG: 1 INJECTION INTRAMUSCULAR; INTRAVENOUS at 08:43

## 2020-12-10 RX ADMIN — FENTANYL CITRATE 50 MCG: 0.05 INJECTION, SOLUTION INTRAMUSCULAR; INTRAVENOUS at 08:40

## 2020-12-10 RX ADMIN — MIDAZOLAM 1 MG: 1 INJECTION INTRAMUSCULAR; INTRAVENOUS at 08:46

## 2020-12-10 RX ADMIN — MIDAZOLAM 1 MG: 1 INJECTION INTRAMUSCULAR; INTRAVENOUS at 08:40

## 2020-12-10 RX ADMIN — FENTANYL CITRATE 50 MCG: 0.05 INJECTION, SOLUTION INTRAMUSCULAR; INTRAVENOUS at 08:44

## 2020-12-10 RX ADMIN — FENTANYL CITRATE 50 MCG: 0.05 INJECTION, SOLUTION INTRAMUSCULAR; INTRAVENOUS at 08:46

## 2020-12-10 NOTE — NURSING NOTE
Pt discharged from ir dept s/p bone marrow biopsy.  Pt tolerated pt without complications.  Pt reports some mild soreness at site at discharge.  Discharge instructions reviewed with patient who verbalizes understanding.  Pt transported to exit via wheelchair per CNA to meet with her  who is pickiing her up.

## 2020-12-10 NOTE — POST-PROCEDURE NOTE
An immediate patient assessment was done prior to the administration of moderate and/or deep conscious sedation.      Anita RIGGINS Ariel      Pre-op Diagnosis:   Chronic leukocytosis and thrombocytosis with generalized fatigue  Post-op diagnosis:  Same      Anesthesia: Moderate sedation    ASA SCALE ASSESSMENT:  2-Mild to moderate systemic disease, medically well controlled, with no functional limitation    MALLAMPATI CLASSIFICATION:  2-Able to visualize the soft palate, fauces, uvula. The anterior & posterior tonsilar pillars are hidden by the tongue.    Staff:   Jase Caban MD      Estimated Blood Loss: Trace    Urine Voided: * No values recorded between 12/10/2020 12:00 AM and 12/10/2020 10:08 AM *    Specimens:                11 gauge core and 11 mL bone marrow      Drains:  None    Findings: N/A    Complications: No immediate      Jase Caban MD     Date: 12/10/2020  Time: 10:08 EST

## 2020-12-11 ENCOUNTER — TELEPHONE (OUTPATIENT)
Dept: INFUSION THERAPY | Facility: HOSPITAL | Age: 35
End: 2020-12-11

## 2020-12-21 ENCOUNTER — TELEPHONE (OUTPATIENT)
Dept: ONCOLOGY | Facility: CLINIC | Age: 35
End: 2020-12-21

## 2020-12-21 NOTE — TELEPHONE ENCOUNTER
Returned call and spoke with pt. Let her know Dr Freed will go over details of BMB results and answer any questions at f/u apt scheduled Monday 12/28.

## 2020-12-21 NOTE — TELEPHONE ENCOUNTER
Patient had bone marrow biopsy 12/10.  Please call her with results.    Also, she is still very tender -no bleeding or bruising.  She would like to know if this is normal.    Please call her  [phone]

## 2020-12-22 ENCOUNTER — OFFICE VISIT (OUTPATIENT)
Dept: ENDOCRINOLOGY | Facility: CLINIC | Age: 35
End: 2020-12-22

## 2020-12-22 VITALS
SYSTOLIC BLOOD PRESSURE: 108 MMHG | WEIGHT: 202.8 LBS | DIASTOLIC BLOOD PRESSURE: 78 MMHG | HEIGHT: 66 IN | HEART RATE: 76 BPM | BODY MASS INDEX: 32.59 KG/M2

## 2020-12-22 DIAGNOSIS — E11.65 UNCONTROLLED TYPE 2 DIABETES MELLITUS WITH HYPERGLYCEMIA (HCC): Primary | ICD-10-CM

## 2020-12-22 DIAGNOSIS — Z79.4 ENCOUNTER FOR LONG-TERM (CURRENT) USE OF INSULIN (HCC): ICD-10-CM

## 2020-12-22 DIAGNOSIS — I10 ESSENTIAL HYPERTENSION: ICD-10-CM

## 2020-12-22 LAB
EXPIRATION DATE: NORMAL
HBA1C MFR BLD: 6.2 %
Lab: NORMAL

## 2020-12-22 PROCEDURE — 99213 OFFICE O/P EST LOW 20 MIN: CPT | Performed by: INTERNAL MEDICINE

## 2020-12-22 PROCEDURE — 83036 HEMOGLOBIN GLYCOSYLATED A1C: CPT | Performed by: INTERNAL MEDICINE

## 2020-12-22 RX ORDER — SEMAGLUTIDE 1.34 MG/ML
1 INJECTION, SOLUTION SUBCUTANEOUS WEEKLY
Qty: 9 ML | Refills: 3 | Status: SHIPPED | OUTPATIENT
Start: 2020-12-22 | End: 2021-09-07

## 2020-12-22 NOTE — ASSESSMENT & PLAN NOTE
Diabetes is worsening.   Continue current treatment regimen.  Diabetes will be reassessed in 3 months.    A1c increased a bit.  She notes increase in appetite and weight.  Will increase ozempic.  Okay to stay off metformin and jardiance for now.

## 2020-12-22 NOTE — PROGRESS NOTES
"     Office Note      Date: 2020  Patient Name: Anita George  MRN: [id number]  : 1985    Chief Complaint   Patient presents with   • Diabetes       History of Present Illness:   Anita George is a 35 y.o. female who presents for Diabetes type 2. Diagnosed in: 2019. Treated in past with oral agents. Current treatments: ozempic and basal insulin. She is off metformin and GI symptoms have improved.  She stopped jardiance about 2 months ago due to yeast infection. Number of insulin shots per day: 1. Checks blood sugar 1 times a day. Has low blood sugar: no. Aspirin use: No -  . Statin use: Yes. ACE-I/ARB use: Yes. Changes in health since last visit: yeast infection. Last eye exam .    Subjective      Diabetic Complications:  Eyes: No  Kidneys: No  Feet: No  Heart: No    Diet and Exercise:  Meals per day: 3  Minutes of exercise per week: 0 mins.    Review of Systems:   Review of Systems   Constitutional: Negative.    Cardiovascular: Negative.    Gastrointestinal: Negative.    Endocrine: Negative.        The following portions of the patient's history were reviewed and updated as appropriate: allergies, current medications, past family history, past medical history, past social history, past surgical history and problem list.    Objective       Visit Vitals  /78 (BP Location: Left arm, Patient Position: Sitting, Cuff Size: Adult)   Pulse 76   Ht 167.6 cm (66\")   Wt 92 kg (202 lb 12.8 oz)   BMI 32.73 kg/m²       Physical Exam:  Physical Exam  Constitutional:       Appearance: Normal appearance.   Neurological:      Mental Status: She is alert.         Labs:    HbA1c  Lab Results   Component Value Date    HGBA1C 6.2 2020       CMP  Lab Results   Component Value Date    GLUCOSE 77 10/16/2020    BUN 14 10/16/2020    CREATININE 0.75 10/16/2020    EGFRIFNONA 87 10/16/2020    EGFRIFAFRI 101 2020    BCR 18.7 10/16/2020    K 3.7 10/16/2020    CO2 29.0 10/16/2020    CALCIUM 9.4 10/16/2020    " PROTENTOTREF 7.1 09/23/2020    LABIL2 1.6 09/23/2020    AST 22 10/16/2020    ALT 29 10/16/2020        Lipid Panel        TSH  Lab Results   Component Value Date    TSH 1.420 09/23/2020        Hemoglobin A1C  Lab Results   Component Value Date    HGBA1C 6.2 12/22/2020        Microalbumin/Creatinine  No results found for: MALBCRERATIO, CREATINIURIN, MICROALBUR        Assessment / Plan      Assessment & Plan:  Problem List Items Addressed This Visit        Cardiovascular and Mediastinum    Essential hypertension    Current Assessment & Plan     Hypertension is unchanged.  Continue current treatment regimen.  Blood pressure will be reassessed in 3 months.         Relevant Medications    amLODIPine (Norvasc) 2.5 MG tablet    losartan (COZAAR) 100 MG tablet       Endocrine    Uncontrolled type 2 diabetes mellitus with hyperglycemia (CMS/[facility]) - Primary    Current Assessment & Plan     Diabetes is worsening.   Continue current treatment regimen.  Diabetes will be reassessed in 3 months.    A1c increased a bit.  She notes increase in appetite and weight.  Will increase ozempic.  Okay to stay off metformin and jardiance for now.         Relevant Medications    Lantus SoloStar 100 UNIT/ML injection pen    Empagliflozin (Jardiance) 10 MG tablet    Semaglutide, 1 MG/DOSE, (Ozempic, 1 MG/DOSE,) 2 MG/1.5ML solution pen-injector    Other Relevant Orders    POC Glycosylated Hemoglobin (Hb A1C) (Completed)       Other    Encounter for long-term (current) use of insulin (CMS/[facility])           Return in about 3 months (around 3/22/2021) for Recheck with A1c, CMP, lipids, TSH, microalbumin.    Fabio West MD   12/22/2020

## 2020-12-28 ENCOUNTER — OFFICE VISIT (OUTPATIENT)
Dept: ONCOLOGY | Facility: CLINIC | Age: 35
End: 2020-12-28

## 2020-12-28 VITALS — WEIGHT: 195 LBS | BODY MASS INDEX: 31.34 KG/M2 | HEIGHT: 66 IN

## 2020-12-28 DIAGNOSIS — D72.828 OTHER ELEVATED WHITE BLOOD CELL (WBC) COUNT: Primary | ICD-10-CM

## 2020-12-28 PROBLEM — D72.829 LEUCOCYTOSIS: Chronic | Status: ACTIVE | Noted: 2020-10-16

## 2020-12-28 PROCEDURE — 99213 OFFICE O/P EST LOW 20 MIN: CPT | Performed by: INTERNAL MEDICINE

## 2020-12-28 NOTE — PROGRESS NOTES
CHIEF COMPLAINT: Reactive leukocytosis    Problem List:  Oncology/Hematology History Overview Note   1.  Chronic leukocytosis  2.  Anxiety depression  3.  Reflux with history of H. pylori positivity  4.  Questionable psoriatic arthritis with presumably falsely positive FARIBA and rheumatoid arthritis rheumatoid factor positive  5.  Migraines  6.  Dyslipidemia  7.  Diabetes  8.  Polycystic ovaries   9.  History of renal calculi  10.  History of pulmonary nodules with unremarkable CT per Dr. Goodwin December 2015  11.  Cholecystectomy for cholecystitis by Dr. epps 12/14/2017  12.  Atopic dermatitis  13.  Exogenous obesity  14.  Mother had breast cancer and pancreatic cancer (patient of mine)    Hematology history:  -8/10/2015 initial [facility] hematology consultation:referred to our office for further evaluation and recommendations in  regard to her leukocytosis. The patient states that for about the past 9 months  or so she has been feeling extremely fatigued to the point that it is  interfering with her daily life. She states that she was noted to have had  slightly elevated white blood cell count on a routine lab work initially  several months ago but at that time had attributed the slight elevation to a  possible sinus infection. She states that on further evaluation in regard to  her ongoing fatigue, more blood work was drawn and she was noted to have an  elevated white count that continued. Recent lab work includes CBC on 08/10/2015  with WBC 11.9, hemoglobin 13.2, hematocrit 38.8%, platelet count 343,000.  Differential was within normal limits other than for absolute lymphocytes  slightly elevated at 5.1.  Previous CBCs include 07/07/2015: WBC 13.9, normal  hemoglobin 13.5, hematocrit 40.8%, platelets slightly elevated at 398,000 and  differential within normal limits other than for absolute neutrophils of 8.2  and absolute lymphocytes of 5.0. At that same time, sedimentation rate was  normal at 6, T3 normal at  "2.7, vitamin D 25-hydroxy was normal at 31.2.      The patient denies any fevers. She does report diffuse joint pain that is worse  in her right wrist and index finger. She also had some tenderness and slight  discomfort in her breast that seems to occur around the time of her menstrual  cycle. She has dry mouth and occasionally will notice a small ulceration in the  inner part of cheek. She also states that she has a nasal ulceration that seems  to \"come and go\".  She has not noted any heart palpitations or chest pain. She  has no shortness of breath other than for the feelings of shortness of breath  that seem to come with extreme fatigue. She has occasional swelling of her feet  that waxes and wanes. She has a history of migraine headaches. She feels  generalized weakness. She reports occasional numbness and tingling in her  hands. She reports a 15 pound weight gain over the past 10 months. She states  that she has chronic constipation, but denies any hematochezia or melena.     I suspect this is a reactive leukocytosis.  The odds of chronic myelogenous leukemia at this young age is very unlikely. We  will check her sedimentation rate and C-reactive protein again, the latter of  which had been a little elevated she says but the sedimentation rate has never  been abnormal thus far. We will also check her serum immunoelectrophoresis. We  will look at her peripheral smear for immature forms. Assuming all this is  negative, we planned to draw at that junction her BCR/ABL gene rearrangement quantitative PCR but I think the odds of that at this young age being chronic myelogenous leukemia is very unlikely and I suspect she has some form of low level  autoimmune disease with the positive FARIBA that is causing her myalgias,  arthralgias, and fatigue with joint stiffness when she gets up, better when she  gets moving, and some pain in her calcaneus, worse on the right. This could  also be psoriatic based on my examination " but I defer of course to the  expertise of Dr. Stevenson Umana.      -8/10/2015 data showed serum immunoelectrophoresis showed polyclonal gammopathy consistent with inflammation.  Peripheral smear showed slight left shift with normal morphology otherwise.  White count was down to normal at 10,580.  C-reactive protein elevated at 7.58 with sedimentation rate of 26.    -8/19/2015 hematology follow-up visit: I reviewed her elevated sedimentation rate and C-reactive protein in the face of rheumatoid factor positive rheumatoid arthritis and psoriatic arthritis followed by Stevenson Umana and given her young age did not recommend BCR abl gene rearrangement testing given low likelihood for myeloproliferative disorder in the face of what is clearly an inflammatory process and to follow-up with rheumatology and primary care.  -12/12/2017 EGD with Dr. Selby on duodenal biopsy showed minimal chronic gastritis without metaplasia dysplasia or malignancy and colonoscopy was negative save for a hyperplastic polyp.  He ruled her out for celiac disease but her white count was 12,700 with a C-reactive protein still elevated at 14.6.  Strongyloidiasis negative.  -5/2/2019 white count normal 7700 with normal CBC, ALT 33, AST 42, sedimentation rate 19.  C-reactive protein 27 upper limit of normal 8.  Normal B12 and folate.  -7/12/2019 saw Dr. Akil Escobar at [facility] rheumatology who thought FARIBA was falsely positive.  He did not appreciate any obvious connective tissue disease but felt she had primary fibromyalgia syndrome.  He could not discern any systemic inflammatory symptoms.  He did not agree with her prior diagnosis of psoriatic arthritis and did not want her taking methotrexate.  Eyelid abnormalities he thought from eczema or dermatitis and suggested follow-up with dermatology.  She did have a few psoriatic patches in her skin but without clinical evidence of psoriatic arthritis  -9/30/2019 C-reactive protein 15.2  upper limit of normal 8 with sedimentation rate 14 and normal liver enzymes.  FARIBA +1: 80 homogeneous.Placed her on Cymbalta.  -1/24/2020 CT abdomen pelvis with contrast [facility] showed hepato-steatosis, stable 3 mm lung nodule benign, punctate renal stones, increasing fatty atrophy of the pancreatic head.  Done for evaluation of elevated liver enzymes and A1c.  -2/27/2020 [facility] showed mild fatty atrophy pancreatic head with no solid or cystic pancreatic lesions.  Endoscopic ultrasound showed mild erythema gastric antrum.  Had fatty infiltration of the pancreas diffusely and fatty liver infiltration.  No significant pathology otherwise.  Based on if pancreatic fluid bicarbonate less than 80 mmol/L than that would be pancreatic insufficiency but if over 80 mmol/L than they would consider normal pancreatic function.  -9/23/2020 white count 13,700 with unremarkable differential and no immature forms with CMP unremarkable save for ALT slightly elevated at 37 upper limit 32 and negative hepatitis C antibody.  Primary care referred back again for the same reason.  -10/16/2020 [facility] hematology consultation: Patient returned again for same issue of chronic leukocytosis.  Now 5 years later with similar level of leukocytosis as to 5 years previous.  We will repeat her CBC, peripheral smear, CMP, urinalysis, sedimentation rate, C-reactive protein, and given all of the questions and despite my low suspicions for there being anything going on here given my prior statements and yet patient returning for same issue, I will check peripheral blood flow cytometry for circulating clone and BCR abl gene rearrangement which would be exceedingly unlikely to appear at the age of 35 and actually younger since this been going on for quite some time.  The white count is almost assuredly related to the multiplicity of potential inflammatory causes as outlined above and unlikely to be pathological in and  of itself.  There have never been significant nucleated red cells, immature forms, fragmented red cells or other cytopenias to go along with this.  She is on no medications that should routinely elevate the white count.  I doubt the white count has anything to do with a malignant process but with her mother's history I will get genetic counseling as her mother had pancreatic cancer come on quickly and did not have genetic testing results before her passing.  -11/23/2020 [facility] hematology follow-up visit: I reviewed her 10/16/2020 data:  Hemoglobin 14.4 with white count 14,330 and platelets 473,000.  Sedimentation rate 21 upper limit of normal 20 so barely elevated with a normal C-reactive protein 0.12.  Peripheral smear showed mild leukocytosis with no left shift, no basophilia, and no blasts.  Peripheral blood flow cytometry showed no significant immunophenotypic abnormality.  BCR abl gene rearrangement was negative.  CMP was entirely normal including liver enzymes, creatinine, total protein, calcium.  Hence, despite the lack of clear-cut inflammatory markers with normal C-reactive protein and barely elevated sedimentation rate, I doubt that this is a myeloproliferative disorder present at the age of 35 and a prior CT 5 years ago of the chest that included upper abdomen showing normal spleen and liver, hence no corroborating evidence to suggest hepatosplenomegaly that might be associated with myeloproliferative disorders.  Cancer next panel was ordered by our genetic counselor, Chely Bowden.  This was done on 11/3/2020 but results are still pending.  She has been off of methotrexate or autoimmune directed therapies for over 5 years.  My suspicions for underlying malignant condition in the bone marrow is very low but given that she has now come to me twice for the same question I will, for completeness sake, get CT-guided bone marrow biopsy and by the time we do virtual visit in 1 month we should have that result  as well as the genetic counseling results.    -12/10/2020 CT-guided bone marrow biopsy showed mild peripheral lymphocytosis without atypia.  Marrow was normocellular with normal trilineage hematopoiesis, scant stainable iron present.  And no increased blasts.  Very slight increase in bone marrow T cells without abnormal clustering or cytologic appearance.  No atypical antigen expression is noted on the T cells by flow cytometry and this is a nonspecific finding.  No myelodysplasia or myeloproliferative disorder.  White count 10,820 with otherwise unremarkable CBC.    -12/22/2020 office visit with Fabio DYE.  Managing diabetes.         Leucocytosis       HISTORY OF PRESENT ILLNESS:  The patient is a 35 y.o. female, here for follow up on management of reactive leukocytosis      Past Medical History:   Diagnosis Date   • Arthritis    • Diabetes mellitus (CMS/HCC)    • Elevated liver enzymes    • GERD (gastroesophageal reflux disease)    • History of kidney disease    • History of squamous cell carcinoma of skin     lip   • Hypertension    • Hypertensive disorder    • Kidney stones    • Leukocytosis    • Long term (current) use of insulin (CMS/HCC)    • Mixed hyperlipidemia    • Obesity     body mass index 30+-obesity   • Squamous cell cancer of lip     lower lip   • Type 2 diabetes mellitus, uncontrolled (CMS/HCC)      Past Surgical History:   Procedure Laterality Date   • BONE MARROW BIOPSY     • CHOLECYSTECTOMY     • ENDOMETRIAL ABLATION     • KIDNEY STONE SURGERY      with stent placement   • KNEE SURGERY Right    • LAPAROSCOPIC TUBAL LIGATION     • MOHS SURGERY      lower lip   • TUBAL ABDOMINAL LIGATION Bilateral        No Known Allergies    Family History and Social History reviewed and changed as necessary      REVIEW OF SYSTEM:   Review of Systems   Constitutional: Negative for appetite change, chills, diaphoresis, fatigue, fever and unexpected weight change.   HENT:   Negative for mouth sores, sore throat  "and trouble swallowing.    Eyes: Negative for icterus.   Respiratory: Negative for cough, hemoptysis and shortness of breath.    Cardiovascular: Negative for chest pain, leg swelling and palpitations.   Gastrointestinal: Negative for abdominal distention, abdominal pain, blood in stool, constipation, diarrhea, nausea and vomiting.   Endocrine: Negative for hot flashes.   Genitourinary: Negative for bladder incontinence, difficulty urinating, dysuria, frequency and hematuria.    Musculoskeletal: Negative for gait problem, neck pain and neck stiffness.   Skin: Negative for rash.   Neurological: Negative for dizziness, gait problem, headaches, light-headedness and numbness.   Hematological: Negative for adenopathy. Does not bruise/bleed easily.   Psychiatric/Behavioral: Negative for depression. The patient is not nervous/anxious.    All other systems reviewed and are negative.       PHYSICAL EXAM    Vitals:    12/28/20 1458   Weight: 88.5 kg (195 lb)   Height: 167.6 cm (66\")     Vitals:    12/28/20 1458   PainSc: 0-No pain        Constitutional: Appears well-developed and well-nourished. No distress.   Phone visit    Lab Results   Component Value Date    HGB 13.8 12/10/2020    HCT 40.9 12/10/2020    MCV 88.9 12/10/2020     12/10/2020    WBC 10.82 (H) 12/10/2020    NEUTROABS 5.43 12/10/2020    LYMPHSABS 4.52 (H) 12/10/2020    MONOSABS 0.57 12/10/2020    EOSABS 0.22 12/10/2020    BASOSABS 0.05 12/10/2020       Lab Results   Component Value Date    GLUCOSE 77 10/16/2020    BUN 14 10/16/2020    CREATININE 0.75 10/16/2020     10/16/2020    K 3.7 10/16/2020     10/16/2020    CO2 29.0 10/16/2020    CALCIUM 9.4 10/16/2020    PROTEINTOT 7.3 10/16/2020    ALBUMIN 4.60 10/16/2020    BILITOT 0.5 10/16/2020    ALKPHOS 79 10/16/2020    AST 22 10/16/2020    ALT 29 10/16/2020                   ASSESSMENT & PLAN:    1. Reactive leukocytosis: Bone marrow I reviewed with her is unremarkable.  No evidence of " myeloproliferative disorder or other sinister causes.  She will follow up with primary care and has sent the saliva test to our genetic counselors who will follow up on her genetic testing relative to her mother who had breast and pancreatic cancer.      Sheldon Freed MD    12/28/2020

## 2021-01-05 ENCOUNTER — TELEPHONE (OUTPATIENT)
Dept: ENDOCRINOLOGY | Facility: CLINIC | Age: 36
End: 2021-01-05

## 2021-01-05 NOTE — TELEPHONE ENCOUNTER
Message from Plan  Your PA request has been approved. Additional information will be provided in the approval communication. (Message 1140)

## 2021-01-06 ENCOUNTER — TELEPHONE (OUTPATIENT)
Dept: ENDOCRINOLOGY | Facility: CLINIC | Age: 36
End: 2021-01-06

## 2021-01-06 NOTE — TELEPHONE ENCOUNTER
Message from Plan  Your PA request has been approved. Additional information will be provided in the approval communication. (Message [id number]

## 2021-01-11 ENCOUNTER — TELEMEDICINE (OUTPATIENT)
Dept: FAMILY MEDICINE CLINIC | Facility: CLINIC | Age: 36
End: 2021-01-11

## 2021-01-11 DIAGNOSIS — F41.8 DEPRESSION WITH ANXIETY: Primary | ICD-10-CM

## 2021-01-11 DIAGNOSIS — E78.2 MIXED HYPERLIPIDEMIA: ICD-10-CM

## 2021-01-11 DIAGNOSIS — K21.9 GASTROESOPHAGEAL REFLUX DISEASE, UNSPECIFIED WHETHER ESOPHAGITIS PRESENT: ICD-10-CM

## 2021-01-11 PROCEDURE — 99213 OFFICE O/P EST LOW 20 MIN: CPT | Performed by: PHYSICIAN ASSISTANT

## 2021-01-11 RX ORDER — OMEPRAZOLE 20 MG/1
20 CAPSULE, DELAYED RELEASE ORAL DAILY
Qty: 30 CAPSULE | Refills: 5 | Status: SHIPPED | OUTPATIENT
Start: 2021-01-11

## 2021-01-11 NOTE — PROGRESS NOTES
Subjective   Anita George is a 35 y.o. female.     You have chosen to receive care through a telehealth visit.  Do you consent to use a video/audio connection for your medical care today? Yes    History of Present Illness   Pt presents for follow up for depression with anxiety   Was tapered off Wellbutrin due to thinking it was contributing to ear ringing Still with tinnitus despite stopping wellbutrin   Stopped metformin endocrinology stomach issues improved   Feels like wellbutrin was working for mood would like to resume      Stopped jardiance due to yeast infection   Increased   34 units of insulin     Needs refill on omeprazole for acid reflux     Stopped her BP medications. No particular reason. States BP been doing okay at home     Also stopped her cholesterol medication       The following portions of the patient's history were reviewed and updated as appropriate: allergies, current medications, past family history, past medical history, past social history, past surgical history and problem list.    Review of Systems   Constitutional: Positive for fatigue. Negative for chills, diaphoresis and fever.   HENT: Negative.  Negative for congestion, ear discharge, ear pain, hearing loss, nosebleeds, postnasal drip, sinus pressure, sneezing and sore throat.    Eyes: Negative.    Respiratory: Negative.  Negative for cough, chest tightness, shortness of breath and wheezing.    Cardiovascular: Negative.  Negative for chest pain, palpitations and leg swelling.   Gastrointestinal: Negative for abdominal distention, abdominal pain, blood in stool, constipation, diarrhea, nausea and vomiting.   Genitourinary: Negative.  Negative for difficulty urinating, dysuria, flank pain, frequency, hematuria and urgency.   Musculoskeletal: Negative.  Negative for arthralgias, back pain, gait problem, joint swelling, myalgias, neck pain and neck stiffness.   Skin: Negative.  Negative for color change, pallor, rash and wound.    Neurological: Negative for dizziness, syncope, weakness, light-headedness, numbness and headaches.   Psychiatric/Behavioral: Positive for dysphoric mood. The patient is nervous/anxious.        Objective    Physical Exam  Constitutional:       Appearance: Normal appearance.   HENT:      Head: Normocephalic.   Pulmonary:      Effort: Pulmonary effort is normal. No respiratory distress.   Neurological:      Mental Status: She is alert and oriented to person, place, and time.   Psychiatric:         Mood and Affect: Mood normal.         Behavior: Behavior normal.         Thought Content: Thought content normal.         Judgment: Judgment normal.         Assessment/Plan   Diagnoses and all orders for this visit:    1. Depression with anxiety (Primary)    2. Mixed hyperlipidemia  -     Lipid Panel    3. Gastroesophageal reflux disease, unspecified whether esophagitis present  -     omeprazole (PrilOSEC) 20 MG capsule; Take 1 capsule by mouth Daily.  Dispense: 30 capsule; Refill: 5    pt will resume losartan 100 mg due to its benefits with DM. Will hold off starting back on amlodipine as BP is staying stable right now.   Pt will return for fasting cholesterol prior to starting back on atorvastatin. We will see how values look to determine dosing. She was previously on 40 mg but pt wishing to be on lowest dose possible. Do think she benefits from remaining on statin due to DM   Pt has plenty of wellbutrin 150 mg ER at home. Will resume this and follow up with symptoms in 2 weeks. May increase to 300 mg ER at that time.   Pt also interested in taking Cymbalta, however will start with just Wellbutrin at this time. Pt agrees.     This visit has been rescheduled as a video visit to comply with patient safety concerns in accordance with CDC recommendations. Total time of discussion was 16 minutes.

## 2021-02-16 LAB
LAB AP ASPIRATE SMEAR: NORMAL
LAB AP CASE REPORT: NORMAL
LAB AP CBC AND DIFFERENTIAL: NORMAL
LAB AP CLINICAL INFORMATION: NORMAL
LAB AP CLOT SECTION: NORMAL
LAB AP CORE BIOPSY: NORMAL
LAB AP DIAGNOSIS COMMENT: NORMAL
LAB AP FLOW CYTOMETRY SUMMARY: NORMAL
PATH REPORT.ADDENDUM SPEC: NORMAL
PATH REPORT.FINAL DX SPEC: NORMAL

## 2021-03-24 ENCOUNTER — PATIENT MESSAGE (OUTPATIENT)
Dept: FAMILY MEDICINE CLINIC | Facility: CLINIC | Age: 36
End: 2021-03-24

## 2021-03-30 DIAGNOSIS — F32.1 CURRENT MODERATE EPISODE OF MAJOR DEPRESSIVE DISORDER WITHOUT PRIOR EPISODE (HCC): Primary | ICD-10-CM

## 2021-03-30 RX ORDER — DULOXETIN HYDROCHLORIDE 20 MG/1
20 CAPSULE, DELAYED RELEASE ORAL DAILY
Qty: 30 CAPSULE | Refills: 0 | Status: SHIPPED | OUTPATIENT
Start: 2021-03-30 | End: 2021-09-07

## 2021-04-26 ENCOUNTER — TELEPHONE (OUTPATIENT)
Dept: FAMILY MEDICINE CLINIC | Facility: CLINIC | Age: 36
End: 2021-04-26

## 2021-04-26 NOTE — TELEPHONE ENCOUNTER
Patient can schedule an appointment for this and we can test and send in appropriate treatment if necessary. ISABEL

## 2021-04-26 NOTE — TELEPHONE ENCOUNTER
Caller: Anita George    Relationship: Self    Best call back number: [phone]    What orders are you requesting (i.e. lab or imaging): LABS    In what timeframe would the patient need to come in: N/A    Where will you receive your lab/imaging services: LOCAL    Additional notes: PATIENT STATES THAT ORDER CAN BE FAXED TO HER -[phone]. SHE STATES THAT SHE DOES HAVE A HISTORY OF KIDNEY STONES AND FEELS LIKE THIS IS WHAT IS WHAT IS GOING ON WITH HER. SHE STATES SHE HAD THE SYMPTOMS OF A UTI AND DID AN OVER THE COUNTER TEST AND THE UTI WAS NEGATIVE BUT THE PATIENT DID HAVE A POSITIVE AMOUNT OF LEUKOCYTES IN HER URINE.

## 2021-04-26 NOTE — TELEPHONE ENCOUNTER
Called informed pt. Stated she would try and come in or just go to the [facility].

## 2021-05-20 ENCOUNTER — HOSPITAL ENCOUNTER (EMERGENCY)
Age: 36
Discharge: HOME | End: 2021-05-20
Payer: COMMERCIAL

## 2021-05-20 VITALS
OXYGEN SATURATION: 99 % | SYSTOLIC BLOOD PRESSURE: 145 MMHG | HEART RATE: 68 BPM | DIASTOLIC BLOOD PRESSURE: 71 MMHG | TEMPERATURE: 98.2 F | RESPIRATION RATE: 17 BRPM

## 2021-05-20 VITALS
HEART RATE: 68 BPM | TEMPERATURE: 98.24 F | RESPIRATION RATE: 17 BRPM | SYSTOLIC BLOOD PRESSURE: 145 MMHG | DIASTOLIC BLOOD PRESSURE: 71 MMHG | OXYGEN SATURATION: 99 %

## 2021-05-20 VITALS — BODY MASS INDEX: 29.8 KG/M2

## 2021-05-20 DIAGNOSIS — R10.31: Primary | ICD-10-CM

## 2021-05-20 DIAGNOSIS — Z87.442: ICD-10-CM

## 2021-05-20 LAB
BUN SERPL-MCNC: 9 MG/DL (ref 7–17)
CREAT BLD-SCNC: 0.8 MG/DL (ref 0.52–1.04)
CREATININE CLEARANCE ESTIMATED: 128 ML/MIN (ref 50–200)
ESTIMATED GLOMERULAR FILT RATE: 81 ML/MIN (ref 60–?)
GFR (AFRICAN AMERICAN): 98 ML/MIN (ref 60–?)
PH,URINE: 6 (ref 5–8.5)
SPECIFIC GRAVITY, URINE: 1.03 (ref 1–1.03)
UROBILINOGEN,URINE: 0.2 EU/DL

## 2021-05-20 PROCEDURE — 81003 URINALYSIS AUTO W/O SCOPE: CPT

## 2021-05-20 PROCEDURE — 82565 ASSAY OF CREATININE: CPT

## 2021-05-20 PROCEDURE — 96372 THER/PROPH/DIAG INJ SC/IM: CPT

## 2021-05-20 PROCEDURE — 84520 ASSAY OF UREA NITROGEN: CPT

## 2021-05-20 PROCEDURE — 99202 OFFICE O/P NEW SF 15 MIN: CPT

## 2021-05-20 PROCEDURE — G0463 HOSPITAL OUTPT CLINIC VISIT: HCPCS

## 2021-05-20 PROCEDURE — 81025 URINE PREGNANCY TEST: CPT

## 2021-06-14 ENCOUNTER — OFFICE VISIT (OUTPATIENT)
Dept: ENDOCRINOLOGY | Facility: CLINIC | Age: 36
End: 2021-06-14

## 2021-06-14 ENCOUNTER — LAB (OUTPATIENT)
Dept: LAB | Facility: HOSPITAL | Age: 36
End: 2021-06-14

## 2021-06-14 VITALS
BODY MASS INDEX: 31.31 KG/M2 | WEIGHT: 194.8 LBS | DIASTOLIC BLOOD PRESSURE: 80 MMHG | OXYGEN SATURATION: 98 % | SYSTOLIC BLOOD PRESSURE: 120 MMHG | HEIGHT: 66 IN | HEART RATE: 70 BPM

## 2021-06-14 DIAGNOSIS — E78.2 MIXED HYPERLIPIDEMIA: ICD-10-CM

## 2021-06-14 DIAGNOSIS — Z79.4 TYPE 2 DIABETES MELLITUS WITHOUT COMPLICATION, WITH LONG-TERM CURRENT USE OF INSULIN (HCC): ICD-10-CM

## 2021-06-14 DIAGNOSIS — Z79.4 TYPE 2 DIABETES MELLITUS WITHOUT COMPLICATION, WITH LONG-TERM CURRENT USE OF INSULIN (HCC): Primary | ICD-10-CM

## 2021-06-14 DIAGNOSIS — I10 ESSENTIAL HYPERTENSION: ICD-10-CM

## 2021-06-14 DIAGNOSIS — E11.9 TYPE 2 DIABETES MELLITUS WITHOUT COMPLICATION, WITH LONG-TERM CURRENT USE OF INSULIN (HCC): ICD-10-CM

## 2021-06-14 DIAGNOSIS — E11.9 TYPE 2 DIABETES MELLITUS WITHOUT COMPLICATION, WITH LONG-TERM CURRENT USE OF INSULIN (HCC): Primary | ICD-10-CM

## 2021-06-14 LAB
ALBUMIN SERPL-MCNC: 4.6 G/DL (ref 3.5–5.2)
ALBUMIN UR-MCNC: <1.2 MG/DL
ALBUMIN/GLOB SERPL: 1.7 G/DL
ALP SERPL-CCNC: 66 U/L (ref 39–117)
ALT SERPL W P-5'-P-CCNC: 20 U/L (ref 1–33)
ANION GAP SERPL CALCULATED.3IONS-SCNC: 6.8 MMOL/L (ref 5–15)
AST SERPL-CCNC: 13 U/L (ref 1–32)
BILIRUB SERPL-MCNC: 0.3 MG/DL (ref 0–1.2)
BUN SERPL-MCNC: 11 MG/DL (ref 6–20)
BUN/CREAT SERPL: 12.6 (ref 7–25)
CALCIUM SPEC-SCNC: 9.8 MG/DL (ref 8.6–10.5)
CHLORIDE SERPL-SCNC: 101 MMOL/L (ref 98–107)
CHOLEST SERPL-MCNC: 178 MG/DL (ref 0–200)
CO2 SERPL-SCNC: 29.2 MMOL/L (ref 22–29)
CREAT SERPL-MCNC: 0.87 MG/DL (ref 0.57–1)
CREAT UR-MCNC: 150.2 MG/DL
EXPIRATION DATE: NORMAL
EXPIRATION DATE: NORMAL
GFR SERPL CREATININE-BSD FRML MDRD: 73 ML/MIN/1.73
GLOBULIN UR ELPH-MCNC: 2.7 GM/DL
GLUCOSE BLDC GLUCOMTR-MCNC: 123 MG/DL (ref 70–130)
GLUCOSE SERPL-MCNC: 104 MG/DL (ref 65–99)
HBA1C MFR BLD: 6.1 %
HDLC SERPL-MCNC: 36 MG/DL (ref 40–60)
LDLC SERPL CALC-MCNC: 106 MG/DL (ref 0–100)
LDLC/HDLC SERPL: 2.81 {RATIO}
Lab: NORMAL
Lab: NORMAL
MICROALBUMIN/CREAT UR: NORMAL MG/G{CREAT}
POTASSIUM SERPL-SCNC: 4.4 MMOL/L (ref 3.5–5.2)
PROT SERPL-MCNC: 7.3 G/DL (ref 6–8.5)
SODIUM SERPL-SCNC: 137 MMOL/L (ref 136–145)
TRIGL SERPL-MCNC: 205 MG/DL (ref 0–150)
TSH SERPL DL<=0.05 MIU/L-ACNC: 1.21 UIU/ML (ref 0.27–4.2)
VLDLC SERPL-MCNC: 36 MG/DL (ref 5–40)

## 2021-06-14 PROCEDURE — 84443 ASSAY THYROID STIM HORMONE: CPT

## 2021-06-14 PROCEDURE — 80061 LIPID PANEL: CPT

## 2021-06-14 PROCEDURE — 82043 UR ALBUMIN QUANTITATIVE: CPT

## 2021-06-14 PROCEDURE — 99214 OFFICE O/P EST MOD 30 MIN: CPT | Performed by: PHYSICIAN ASSISTANT

## 2021-06-14 PROCEDURE — 82570 ASSAY OF URINE CREATININE: CPT

## 2021-06-14 PROCEDURE — 82947 ASSAY GLUCOSE BLOOD QUANT: CPT | Performed by: PHYSICIAN ASSISTANT

## 2021-06-14 PROCEDURE — 83036 HEMOGLOBIN GLYCOSYLATED A1C: CPT | Performed by: PHYSICIAN ASSISTANT

## 2021-06-14 PROCEDURE — 80053 COMPREHEN METABOLIC PANEL: CPT

## 2021-06-14 NOTE — PROGRESS NOTES
"     Office Note      Date: 2021  Patient Name: Anita George  MRN: [id number]  : 1985    Chief Complaint   Patient presents with   • Follow-up   • Diabetes     type2    • Diabetic Eye Exam     6 months ago        History of Present Illness:   Anita George is a 35 y.o. female who presents for Type 2 diabetes follow up.  Dr. West increased her Ozempic at her appointment in December to try to help promote weight loss.  She reports the last several months have been stressful and she has not been taking her medications like she should.  She reports she has not been regularly taking Lantus for at least the last 3 months.  She has been taking the Ozempic 1 mg weekly regularly.  She has been off her losartan and atorvastatin at least a month.  She reports her fasting blood sugars in the mornings are typically in the 120s she has been working on healthier eating habits including intermittent fasting recently, but admits the last few months she has not been on track.  She has a history of gastrointestinal upset on Metformin and a bad yeast infection on Jardiance in the past.  She is up-to-date on her eye exam she typically goes in the fall.  She has had both doses of her Covid vaccine.  She denies any trouble with her feet today.  She is fasting this morning.    Subjective     Review of Systems:   Review of Systems   Constitutional: Negative.    Cardiovascular: Negative.    Gastrointestinal: Negative.    Endocrine: Negative.    Neurological: Negative.        The following portions of the patient's history were reviewed and updated as appropriate: allergies, current medications, past family history, past medical history, past social history, past surgical history and problem list.    Objective     Vitals:    21 0908   BP: 120/80   Pulse: 70   SpO2: 98%   Weight: 88.4 kg (194 lb 12.8 oz)   Height: 167.6 cm (66\")     Body mass index is 31.44 kg/m².    Physical Exam  Vitals reviewed.   Constitutional:      "  General: She is not in acute distress.     Appearance: Normal appearance.   Cardiovascular:      Pulses:           Dorsalis pedis pulses are 2+ on the right side and 2+ on the left side.        Posterior tibial pulses are 2+ on the right side and 2+ on the left side.   Musculoskeletal:      Right foot: No deformity.      Left foot: No deformity.   Feet:      Right foot:      Protective Sensation: 5 sites tested. 5 sites sensed.      Skin integrity: Skin integrity normal.      Toenail Condition: Right toenails are normal.      Left foot:      Protective Sensation: 5 sites tested. 5 sites sensed.      Skin integrity: Skin integrity normal.      Toenail Condition: Left toenails are normal.      Comments: Diabetic Foot Exam Performed and Monofilament Test Performed    Neurological:      Mental Status: She is alert.         HEMOGLOBIN A1C  Lab Results   Component Value Date    HGBA1C 6.1 06/14/2021         Assessment / Plan      Assessment & Plan:  1. Type 2 diabetes mellitus without complication, with long-term current use of insulin (CMS/[facility])  Her A1c today is 6.1% despite not taking Lantus regularly the last 3 months.  She will continue Ozempic 1 mg weekly for now and stay off the Lantus.  I encouraged her to check her blood sugars regularly alternating in the mornings and in the evenings and contact me if her blood sugars become elevated.  We discussed a trial of Jardiance if her blood sugars are not controlled since it has been a while since she is taking this and it may help promote weight loss.  I encouraged healthy eating habits and physical activity as tolerated.  Her fasting labs are pending today.  Will send note with results and plan.    - POC Glycosylated Hemoglobin (Hb A1C)  - POC Glucose, Blood  - Comprehensive Metabolic Panel; Future  - Microalbumin / Creatinine Urine Ratio - Urine, Clean Catch; Future  - Lipid Panel; Future  - TSH; Future    2. Essential hypertension  Her blood pressure is okay today  despite being off her losartan for 1 month.  We discussed losartan a protective medication for the kidneys with diabetes.  We are checking her urine microalbumin/creatinine ratio today.  Will send note with results and plan.    3. Mixed hyperlipidemia  Fasting labs pending.  Will send note with results and plan.  We will discuss resuming atorvastatin once labs reviewed.  - Lipid Panel; Future      Return in about 3 months (around 9/14/2021) for  Recheck.     Elvira Daly PA-C  06/14/2021

## 2021-09-07 ENCOUNTER — OFFICE VISIT (OUTPATIENT)
Dept: ENDOCRINOLOGY | Facility: CLINIC | Age: 36
End: 2021-09-07

## 2021-09-07 VITALS
WEIGHT: 201.2 LBS | DIASTOLIC BLOOD PRESSURE: 72 MMHG | RESPIRATION RATE: 16 BRPM | HEIGHT: 66 IN | OXYGEN SATURATION: 98 % | SYSTOLIC BLOOD PRESSURE: 116 MMHG | BODY MASS INDEX: 32.33 KG/M2 | HEART RATE: 71 BPM

## 2021-09-07 DIAGNOSIS — Z79.4 TYPE 2 DIABETES MELLITUS WITHOUT COMPLICATION, WITH LONG-TERM CURRENT USE OF INSULIN (HCC): Primary | ICD-10-CM

## 2021-09-07 DIAGNOSIS — E11.9 TYPE 2 DIABETES MELLITUS WITHOUT COMPLICATION, WITH LONG-TERM CURRENT USE OF INSULIN (HCC): Primary | ICD-10-CM

## 2021-09-07 LAB
EXPIRATION DATE: NORMAL
EXPIRATION DATE: NORMAL
GLUCOSE BLDC GLUCOMTR-MCNC: 119 MG/DL (ref 70–130)
HBA1C MFR BLD: 6.2 %
Lab: NORMAL
Lab: NORMAL

## 2021-09-07 PROCEDURE — 83036 HEMOGLOBIN GLYCOSYLATED A1C: CPT | Performed by: PHYSICIAN ASSISTANT

## 2021-09-07 PROCEDURE — 99213 OFFICE O/P EST LOW 20 MIN: CPT | Performed by: PHYSICIAN ASSISTANT

## 2021-09-07 PROCEDURE — 82947 ASSAY GLUCOSE BLOOD QUANT: CPT | Performed by: PHYSICIAN ASSISTANT

## 2021-09-07 RX ORDER — SEMAGLUTIDE 1.34 MG/ML
0.5 INJECTION, SOLUTION SUBCUTANEOUS WEEKLY
Qty: 4.5 ML | Refills: 1 | Status: SHIPPED | OUTPATIENT
Start: 2021-09-07 | End: 2021-12-20

## 2021-09-07 NOTE — PROGRESS NOTES
"     Office Note      Date: 2021  Patient Name: Anita George  MRN: [id number]  : 1985    Chief Complaint   Patient presents with   • Diabetes     3 month F/U        History of Present Illness:   Anita George is a 36 y.o. female who presents for follow-up for type 2 diabetes.  She remains on Ozempic 1 mg weekly.  She reports she is frustrated with her weight gain.  She reports she feels like she is hungry all the time.  She reports recently her fasting blood sugars have been around 130 mg/dL.  She denies any trouble with hypoglycemia.  She is asking about going back on the Jardiance that she was able to lose more weight when she was on the combination of this and Ozempic in the past.  She reports she had her annual eye exam yesterday and they should be sending us a report.  She had both doses of her Covid vaccine and plans to get the flu vaccine this fall.  We did her foot exam at her appointment in .  She is currently in the boot after she fell and tore some tendon she is currently going to physical therapy and is hoping to be able to walk regularly soon.      Subjective     Review of Systems:   Review of Systems   Constitutional: Negative.    Cardiovascular: Negative.    Gastrointestinal: Negative.    Endocrine: Negative.    Neurological: Negative.        The following portions of the patient's history were reviewed and updated as appropriate: allergies, current medications, past family history, past medical history, past social history, past surgical history and problem list.    Objective     Vitals:    21 1004   BP: 116/72   Pulse: 71   Resp: 16   SpO2: 98%   Weight: 91.3 kg (201 lb 3.2 oz)   Height: 167.6 cm (66\")     Body mass index is 32.47 kg/m².    Physical Exam  Vitals reviewed.   Constitutional:       General: She is not in acute distress.     Appearance: Normal appearance.   Neurological:      Mental Status: She is alert.         HEMOGLOBIN A1C  Lab Results   Component Value " Date    HGBA1C 6.2 09/07/2021       GLUCOSE  Glucose   Date Value Ref Range Status   09/07/2021 119 70 - 130 mg/dL Final       CMP  Lab Results   Component Value Date    GLUCOSE 104 (H) 06/14/2021    BUN 11 06/14/2021    CREATININE 0.87 06/14/2021    EGFRIFNONA 73 06/14/2021    EGFRIFAFRI 101 09/23/2020    BCR 12.6 06/14/2021    K 4.4 06/14/2021    CO2 29.2 (H) 06/14/2021    CALCIUM 9.8 06/14/2021    PROTENTOTREF 7.1 09/23/2020    LABIL2 1.6 09/23/2020    AST 13 06/14/2021    ALT 20 06/14/2021       LIPID PANEL  Lab Results   Component Value Date    CHOL 178 06/14/2021    TRIG 205 (H) 06/14/2021    HDL 36 (L) 06/14/2021     (H) 06/14/2021       URINE MICROALBUMIN/CREATININE RATIO  Microalbumin/Creatinine Ratio   Date Value Ref Range Status   06/14/2021   Final     Comment:     Unable to calculate       TSH  Lab Results   Component Value Date    TSH 1.210 06/14/2021       Assessment / Plan      Assessment & Plan:  1. Type 2 diabetes mellitus without complication, with long-term current use of insulin (CMS/[facility])  Her A1c today is excellent at 6.2% just on Ozempic 1 mg weekly.  She is asking about trying Jardiance in addition to the Ozempic.  We discussed reducing Ozempic to 0.5 mg weekly and adding Jardiance 25 mg daily to see if this helps promote weight loss.  We discussed the possible side effects of Jardiance including yeast infection and urinary tract infection.  I advised her if she gets a urinary tract infection she should check in with her primary care physician or urgent care to have a urinalysis done but if she gets a yeast infection she can contact the office.  Discussed the risk and symptoms of hypoglycemia.  She will contact me if this becomes a problem.  Her blood pressure is excellent today off the losartan.  Her urine microalbumin was okay at her appointment in June.  Her weight is up 6 pounds since her appointment in June.  I encouraged healthy eating habits and physical activity as  tolerated.  Patient to call as needed.  - POC Glucose  - POC Glycosylated Hemoglobin (Hb A1C)      Return in about 3 months (around 12/7/2021) for Recheck 3-4 mos.     This note was dictated using Dragon voice recognition.    Elvira Daly PA-C  09/07/2021

## 2021-09-08 ENCOUNTER — PRIOR AUTHORIZATION (OUTPATIENT)
Dept: ENDOCRINOLOGY | Facility: CLINIC | Age: 36
End: 2021-09-08

## 2021-09-08 NOTE — TELEPHONE ENCOUNTER
Approvedtoday  Your PA request has been approved. Additional information will be provided in the approval communication. (Message 1145)  Drug  Jardiance 25MG tablets  Form  [facility] Electronic PA Form (2017 [employer])

## 2021-10-15 NOTE — TELEPHONE ENCOUNTER
"I can't send in a prescription for test strips with the sig \"use as directed.\"  Especially when there is a quantity of 400 requested.  Need to know how many times per day she is testing.  She is not on insulin.  Blood sugars controlled.  Is she testing once per day?  Thank you.  "

## 2021-10-15 NOTE — TELEPHONE ENCOUNTER
PATIENT IS REQUESTING AN RX FOR ONE TOUCH VERIO TEST STRIPS. SHE HAS TWO REMAINING AT TIME OF CALL. SHE STATES THIS WAS PREVIOUSLY FILLED BY HER PCP BUT IS NOW ESTABLISHED WITH THIS OFFICE FOR ENDO ISSUES.

## 2021-11-12 ENCOUNTER — TRANSCRIBE ORDERS (OUTPATIENT)
Dept: ADMINISTRATIVE | Facility: HOSPITAL | Age: 36
End: 2021-11-12

## 2021-11-12 DIAGNOSIS — Z80.3 FAMILY HISTORY OF BREAST CANCER: Primary | ICD-10-CM

## 2021-12-20 ENCOUNTER — OFFICE VISIT (OUTPATIENT)
Dept: ENDOCRINOLOGY | Facility: CLINIC | Age: 36
End: 2021-12-20

## 2021-12-20 VITALS
HEART RATE: 78 BPM | HEIGHT: 66 IN | BODY MASS INDEX: 32.47 KG/M2 | OXYGEN SATURATION: 98 % | WEIGHT: 202 LBS | DIASTOLIC BLOOD PRESSURE: 88 MMHG | SYSTOLIC BLOOD PRESSURE: 128 MMHG

## 2021-12-20 DIAGNOSIS — Z79.4 TYPE 2 DIABETES MELLITUS WITHOUT COMPLICATION, WITH LONG-TERM CURRENT USE OF INSULIN (HCC): Primary | ICD-10-CM

## 2021-12-20 DIAGNOSIS — E11.9 TYPE 2 DIABETES MELLITUS WITHOUT COMPLICATION, WITH LONG-TERM CURRENT USE OF INSULIN (HCC): Primary | ICD-10-CM

## 2021-12-20 LAB
EXPIRATION DATE: ABNORMAL
EXPIRATION DATE: NORMAL
GLUCOSE BLDC GLUCOMTR-MCNC: 192 MG/DL (ref 70–130)
HBA1C MFR BLD: 6.4 %
Lab: ABNORMAL
Lab: NORMAL

## 2021-12-20 PROCEDURE — 82947 ASSAY GLUCOSE BLOOD QUANT: CPT | Performed by: PHYSICIAN ASSISTANT

## 2021-12-20 PROCEDURE — 99213 OFFICE O/P EST LOW 20 MIN: CPT | Performed by: PHYSICIAN ASSISTANT

## 2021-12-20 PROCEDURE — 83036 HEMOGLOBIN GLYCOSYLATED A1C: CPT | Performed by: PHYSICIAN ASSISTANT

## 2021-12-20 RX ORDER — SEMAGLUTIDE 1.34 MG/ML
1 INJECTION, SOLUTION SUBCUTANEOUS WEEKLY
Qty: 9 ML | Refills: 2 | Status: SHIPPED | OUTPATIENT
Start: 2021-12-20 | End: 2022-07-14

## 2021-12-20 NOTE — PROGRESS NOTES
"     Office Note      Date: 2021  Patient Name: Anita George  MRN: [id number]  : 1985    Chief Complaint   Patient presents with   • Diabetes       History of Present Illness:   Anita George is a 36 y.o. female who presents for follow-up for type 2 diabetes.  At her appointment in September we reduced her Ozempic to 0.5 mg weekly and added Jardiance 25 mg daily.  She reports she has not been able to lose weight on this combination and has noticed her blood glucose readings in the mornings have been higher 145 to 160 mg/dL most of the time.  She reports she has not had any trouble with hypoglycemia.  She is wondering if we can increase her Ozempic back to the 1 mg dose.  She is up-to-date on her eye exam she went .  She has had her Covid booster as well as her flu vaccine this .  She denies any trouble with her feet today we did her foot exam as well as fasting labs at her appointment in .  She is out of her walking boot but continues to have some ankle pain.  She reports she is exercising regularly which is why the weight gain bothers her so much.      Subjective     Review of Systems:   Review of Systems   Constitutional: Negative.    Cardiovascular: Negative.    Gastrointestinal: Negative.    Endocrine: Negative.    Neurological: Negative.        The following portions of the patient's history were reviewed and updated as appropriate: allergies, current medications, past family history, past medical history, past social history, past surgical history and problem list.    Objective     Vitals:    21 1104   BP: 128/88   BP Location: Right arm   Patient Position: Sitting   Cuff Size: Adult   Pulse: 78   SpO2: 98%   Weight: 91.6 kg (202 lb)   Height: 167.6 cm (66\")   PainSc: 0-No pain     Body mass index is 32.6 kg/m².    Physical Exam  Vitals reviewed.   Constitutional:       General: She is not in acute distress.     Appearance: Normal appearance.   Neurological:     "  Mental Status: She is alert.         HEMOGLOBIN A1C  Lab Results   Component Value Date    HGBA1C 6.4 12/20/2021       GLUCOSE  Glucose   Date Value Ref Range Status   12/20/2021 192 (A) 70 - 130 mg/dL Final       CMP  Lab Results   Component Value Date    GLUCOSE 104 (H) 06/14/2021    BUN 11 06/14/2021    CREATININE 0.87 06/14/2021    EGFRIFNONA 73 06/14/2021    EGFRIFAFRI 101 09/23/2020    BCR 12.6 06/14/2021    K 4.4 06/14/2021    CO2 29.2 (H) 06/14/2021    CALCIUM 9.8 06/14/2021    PROTENTOTREF 7.1 09/23/2020    LABIL2 1.6 09/23/2020    AST 13 06/14/2021    ALT 20 06/14/2021       LIPID PANEL  Lab Results   Component Value Date    CHOL 178 06/14/2021    TRIG 205 (H) 06/14/2021    HDL 36 (L) 06/14/2021     (H) 06/14/2021       URINE MICROALBUMIN/CREATININE RATIO  Microalbumin/Creatinine Ratio   Date Value Ref Range Status   06/14/2021   Final     Comment:     Unable to calculate       TSH  Lab Results   Component Value Date    TSH 1.210 06/14/2021       Assessment / Plan      Assessment & Plan:  1. Type 2 diabetes mellitus without complication, with long-term current use of insulin (HCC)  Her blood glucose readings have been higher since we reduced the Ozempic and added Jardiance.  Her A1c today is excellent at 6.4%.  We will try increasing the Ozempic to 1 mg weekly to try to improve higher blood sugars in the morning and promote weight loss.  I encouraged healthy eating habits and physical activity as tolerated.  Her blood pressure is up some today.  She has been off the losartan for several months.  She will monitor this at home and contact me if this remains above 130/80.  Her weight is up 1 pound since her appointment in September.  Patient to call as needed.  - POC Glycosylated Hemoglobin (Hb A1C)  - POC Glucose, Blood      Return in about 3 months (around 3/20/2022) for Recheck 3-4 mos.     MARC Carpio-WES  12/20/2021

## 2021-12-30 ENCOUNTER — LAB (OUTPATIENT)
Dept: LAB | Facility: HOSPITAL | Age: 36
End: 2021-12-30

## 2021-12-30 ENCOUNTER — TRANSCRIBE ORDERS (OUTPATIENT)
Dept: LAB | Facility: HOSPITAL | Age: 36
End: 2021-12-30

## 2021-12-30 DIAGNOSIS — N92.6 IRREGULAR MENSTRUAL CYCLE: Primary | ICD-10-CM

## 2021-12-30 DIAGNOSIS — N92.6 IRREGULAR MENSTRUAL CYCLE: ICD-10-CM

## 2021-12-30 LAB
ESTRADIOL SERPL HS-MCNC: 34.2 PG/ML
FSH SERPL-ACNC: 4.41 MIU/ML
LH SERPL-ACNC: 5.57 MIU/ML
PROGEST SERPL-MCNC: 0.29 NG/ML
TESTOST SERPL-MCNC: 15.4 NG/DL (ref 8.4–48.1)

## 2021-12-30 PROCEDURE — 84144 ASSAY OF PROGESTERONE: CPT | Performed by: NURSE PRACTITIONER

## 2021-12-30 PROCEDURE — 84403 ASSAY OF TOTAL TESTOSTERONE: CPT

## 2021-12-30 PROCEDURE — 83001 ASSAY OF GONADOTROPIN (FSH): CPT

## 2021-12-30 PROCEDURE — 83002 ASSAY OF GONADOTROPIN (LH): CPT

## 2021-12-30 PROCEDURE — 36415 COLL VENOUS BLD VENIPUNCTURE: CPT | Performed by: NURSE PRACTITIONER

## 2021-12-30 PROCEDURE — 82670 ASSAY OF TOTAL ESTRADIOL: CPT | Performed by: NURSE PRACTITIONER

## 2022-01-17 ENCOUNTER — HOSPITAL ENCOUNTER (OUTPATIENT)
Dept: MAMMOGRAPHY | Facility: HOSPITAL | Age: 37
Discharge: HOME | End: 2022-01-17
Admitting: NURSE PRACTITIONER

## 2022-01-17 DIAGNOSIS — Z80.3 FAMILY HISTORY OF BREAST CANCER: ICD-10-CM

## 2022-01-17 PROCEDURE — 77063 BREAST TOMOSYNTHESIS BI: CPT

## 2022-01-17 PROCEDURE — 77063 BREAST TOMOSYNTHESIS BI: CPT | Performed by: RADIOLOGY

## 2022-01-17 PROCEDURE — 77067 SCR MAMMO BI INCL CAD: CPT

## 2022-01-17 PROCEDURE — 77067 SCR MAMMO BI INCL CAD: CPT | Performed by: RADIOLOGY

## 2022-03-16 RX ORDER — EMPAGLIFLOZIN 25 MG/1
TABLET, FILM COATED ORAL
Qty: 90 TABLET | Refills: 1 | Status: SHIPPED | OUTPATIENT
Start: 2022-03-16 | End: 2022-07-14 | Stop reason: SDUPTHER

## 2022-04-04 ENCOUNTER — OFFICE VISIT (OUTPATIENT)
Dept: ENDOCRINOLOGY | Facility: CLINIC | Age: 37
End: 2022-04-04

## 2022-04-04 VITALS
OXYGEN SATURATION: 98 % | HEIGHT: 67 IN | BODY MASS INDEX: 29.66 KG/M2 | HEART RATE: 73 BPM | SYSTOLIC BLOOD PRESSURE: 118 MMHG | DIASTOLIC BLOOD PRESSURE: 70 MMHG | WEIGHT: 189 LBS

## 2022-04-04 DIAGNOSIS — E11.9 TYPE 2 DIABETES MELLITUS WITHOUT COMPLICATION, WITH LONG-TERM CURRENT USE OF INSULIN: Primary | ICD-10-CM

## 2022-04-04 DIAGNOSIS — Z79.4 TYPE 2 DIABETES MELLITUS WITHOUT COMPLICATION, WITH LONG-TERM CURRENT USE OF INSULIN: Primary | ICD-10-CM

## 2022-04-04 LAB
EXPIRATION DATE: NORMAL
EXPIRATION DATE: NORMAL
GLUCOSE BLDC GLUCOMTR-MCNC: 107 MG/DL (ref 70–130)
HBA1C MFR BLD: 6.5 %
Lab: NORMAL
Lab: NORMAL

## 2022-04-04 PROCEDURE — 83036 HEMOGLOBIN GLYCOSYLATED A1C: CPT | Performed by: PHYSICIAN ASSISTANT

## 2022-04-04 PROCEDURE — 82947 ASSAY GLUCOSE BLOOD QUANT: CPT | Performed by: PHYSICIAN ASSISTANT

## 2022-04-04 PROCEDURE — 99213 OFFICE O/P EST LOW 20 MIN: CPT | Performed by: PHYSICIAN ASSISTANT

## 2022-04-04 RX ORDER — CYCLOBENZAPRINE HCL 10 MG
10 TABLET ORAL AS NEEDED
COMMUNITY
Start: 2022-02-23 | End: 2023-02-20

## 2022-04-04 RX ORDER — ALBUTEROL SULFATE 90 UG/1
AEROSOL, METERED RESPIRATORY (INHALATION) AS NEEDED
COMMUNITY
Start: 2022-03-21

## 2022-04-04 NOTE — PROGRESS NOTES
"     Office Note      Date: 2022  Patient Name: Anita George  MRN: [id number]  : 1985    Chief Complaint   Patient presents with   • Diabetes       History of Present Illness:   Aniat George is a 36 y.o. female who presents for follow-up for type 2 diabetes.  She is taking the Ozempic 1 mg weekly and Jardiance 25 mg daily.  She reports she has been able to lose some weight on this combination and feels well.  She reports her fasting blood sugars are typically around 130 and have remained stable.  She denies any trouble with hypoglycemia.  She is up-to-date on her eye exam she went 2021.  She denies any trouble with her feet today we did her foot exam as well as fasting labs at her appointment in .  She is having left shoulder surgery next month.  She is having significant pain and trouble sleeping.  They plan to scope the shoulder and try to clean up the area.      Subjective     Review of Systems:   Review of Systems   Constitutional: Negative.    Cardiovascular: Negative.    Gastrointestinal: Negative.    Endocrine: Negative.    Musculoskeletal: Positive for arthralgias.   Neurological: Negative.        The following portions of the patient's history were reviewed and updated as appropriate: allergies, current medications, past family history, past medical history, past social history, past surgical history and problem list.    Objective     Vitals:    22 1540   BP: 118/70   Pulse: 73   SpO2: 98%   Weight: 85.7 kg (189 lb)   Height: 170.2 cm (67\")   PainSc: 0-No pain     Body mass index is 29.6 kg/m².    Physical Exam  Vitals reviewed.   Constitutional:       General: She is not in acute distress.     Appearance: Normal appearance.   Neurological:      Mental Status: She is alert.         HEMOGLOBIN A1C  Lab Results   Component Value Date    HGBA1C 6.5 2022       GLUCOSE  Glucose   Date Value Ref Range Status   2022 107 70 - 130 mg/dL Final       CMP  Lab Results "   Component Value Date    GLUCOSE 104 (H) 06/14/2021    BUN 11 06/14/2021    CREATININE 0.87 06/14/2021    EGFRIFNONA 73 06/14/2021    EGFRIFAFRI 101 09/23/2020    BCR 12.6 06/14/2021    K 4.4 06/14/2021    CO2 29.2 (H) 06/14/2021    CALCIUM 9.8 06/14/2021    PROTENTOTREF 7.1 09/23/2020    LABIL2 1.6 09/23/2020    AST 13 06/14/2021    ALT 20 06/14/2021       LIPID PANEL  Lab Results   Component Value Date    CHOL 178 06/14/2021    TRIG 205 (H) 06/14/2021    HDL 36 (L) 06/14/2021     (H) 06/14/2021       URINE MICROALBUMIN/CREATININE RATIO  Microalbumin/Creatinine Ratio   Date Value Ref Range Status   06/14/2021   Final     Comment:     Unable to calculate       TSH  Lab Results   Component Value Date    TSH 1.210 06/14/2021       Assessment / Plan      Assessment & Plan:  1. Type 2 diabetes mellitus without complication, with long-term current use of insulin (HCC)  Her A1c today is 6.5%.  This is up slightly from December but is still excellent.  She will continue Ozempic 1 mg weekly and Jardiance 25 mg daily.  Her blood pressure is okay today.  Her weight is down 13 pounds since her appointment in December.  I encouraged continued healthy eating habits and physical activity as tolerated.  We will plan to check fasting labs as well as do her foot exam at her follow-up appointment in 3 to 4 months for monitoring.  Patient to call as needed.  - POC Glycosylated Hemoglobin (Hb A1C)  - POC Glucose, Blood      Return in about 3 months (around 7/4/2022) for Recheck 3-4 mos, fasting.     This note was dictated using Dragon voice recognition.    MARC Carpio-WES  04/04/2022

## 2022-07-14 ENCOUNTER — LAB (OUTPATIENT)
Dept: LAB | Facility: HOSPITAL | Age: 37
End: 2022-07-14

## 2022-07-14 ENCOUNTER — OFFICE VISIT (OUTPATIENT)
Dept: ENDOCRINOLOGY | Facility: CLINIC | Age: 37
End: 2022-07-14

## 2022-07-14 VITALS
DIASTOLIC BLOOD PRESSURE: 77 MMHG | HEIGHT: 66 IN | OXYGEN SATURATION: 99 % | WEIGHT: 198 LBS | HEART RATE: 74 BPM | BODY MASS INDEX: 31.82 KG/M2 | SYSTOLIC BLOOD PRESSURE: 120 MMHG

## 2022-07-14 DIAGNOSIS — E11.9 TYPE 2 DIABETES MELLITUS WITHOUT COMPLICATION, WITH LONG-TERM CURRENT USE OF INSULIN: ICD-10-CM

## 2022-07-14 DIAGNOSIS — E78.2 MIXED HYPERLIPIDEMIA: ICD-10-CM

## 2022-07-14 DIAGNOSIS — Z79.4 TYPE 2 DIABETES MELLITUS WITHOUT COMPLICATION, WITH LONG-TERM CURRENT USE OF INSULIN: Primary | ICD-10-CM

## 2022-07-14 DIAGNOSIS — E11.9 TYPE 2 DIABETES MELLITUS WITHOUT COMPLICATION, WITH LONG-TERM CURRENT USE OF INSULIN: Primary | ICD-10-CM

## 2022-07-14 DIAGNOSIS — Z79.4 TYPE 2 DIABETES MELLITUS WITHOUT COMPLICATION, WITH LONG-TERM CURRENT USE OF INSULIN: ICD-10-CM

## 2022-07-14 LAB
ALBUMIN SERPL-MCNC: 4.1 G/DL (ref 3.5–5.2)
ALBUMIN UR-MCNC: 1.7 MG/DL
ALBUMIN/GLOB SERPL: 1.6 G/DL
ALP SERPL-CCNC: 62 U/L (ref 39–117)
ALT SERPL W P-5'-P-CCNC: 25 U/L (ref 1–33)
ANION GAP SERPL CALCULATED.3IONS-SCNC: 11.6 MMOL/L (ref 5–15)
AST SERPL-CCNC: 18 U/L (ref 1–32)
BILIRUB SERPL-MCNC: 0.4 MG/DL (ref 0–1.2)
BUN SERPL-MCNC: 11 MG/DL (ref 6–20)
BUN/CREAT SERPL: 13.6 (ref 7–25)
CALCIUM SPEC-SCNC: 8.9 MG/DL (ref 8.6–10.5)
CHLORIDE SERPL-SCNC: 106 MMOL/L (ref 98–107)
CHOLEST SERPL-MCNC: 160 MG/DL (ref 0–200)
CO2 SERPL-SCNC: 24.4 MMOL/L (ref 22–29)
CREAT SERPL-MCNC: 0.81 MG/DL (ref 0.57–1)
CREAT UR-MCNC: 193 MG/DL
EGFRCR SERPLBLD CKD-EPI 2021: 95.4 ML/MIN/1.73
EXPIRATION DATE: ABNORMAL
EXPIRATION DATE: NORMAL
GLOBULIN UR ELPH-MCNC: 2.6 GM/DL
GLUCOSE BLDC GLUCOMTR-MCNC: 144 MG/DL (ref 70–130)
GLUCOSE SERPL-MCNC: 137 MG/DL (ref 65–99)
HBA1C MFR BLD: 6.6 %
HDLC SERPL-MCNC: 36 MG/DL (ref 40–60)
LDLC SERPL CALC-MCNC: 92 MG/DL (ref 0–100)
LDLC/HDLC SERPL: 2.41 {RATIO}
Lab: ABNORMAL
Lab: NORMAL
MICROALBUMIN/CREAT UR: 8.8 MG/G
POTASSIUM SERPL-SCNC: 4.3 MMOL/L (ref 3.5–5.2)
PROT SERPL-MCNC: 6.7 G/DL (ref 6–8.5)
SODIUM SERPL-SCNC: 142 MMOL/L (ref 136–145)
TRIGL SERPL-MCNC: 187 MG/DL (ref 0–150)
TSH SERPL DL<=0.05 MIU/L-ACNC: 1.22 UIU/ML (ref 0.27–4.2)
VLDLC SERPL-MCNC: 32 MG/DL (ref 5–40)

## 2022-07-14 PROCEDURE — 82947 ASSAY GLUCOSE BLOOD QUANT: CPT | Performed by: PHYSICIAN ASSISTANT

## 2022-07-14 PROCEDURE — 84443 ASSAY THYROID STIM HORMONE: CPT

## 2022-07-14 PROCEDURE — 99214 OFFICE O/P EST MOD 30 MIN: CPT | Performed by: PHYSICIAN ASSISTANT

## 2022-07-14 PROCEDURE — 82043 UR ALBUMIN QUANTITATIVE: CPT

## 2022-07-14 PROCEDURE — 80061 LIPID PANEL: CPT

## 2022-07-14 PROCEDURE — 83036 HEMOGLOBIN GLYCOSYLATED A1C: CPT | Performed by: PHYSICIAN ASSISTANT

## 2022-07-14 PROCEDURE — 80053 COMPREHEN METABOLIC PANEL: CPT

## 2022-07-14 PROCEDURE — 82570 ASSAY OF URINE CREATININE: CPT

## 2022-07-14 RX ORDER — SEMAGLUTIDE 2.68 MG/ML
2 INJECTION, SOLUTION SUBCUTANEOUS WEEKLY
Qty: 9 ML | Refills: 2 | Status: SHIPPED | OUTPATIENT
Start: 2022-07-14

## 2022-07-14 NOTE — PROGRESS NOTES
"     Office Note      Date: 2022  Patient Name: Anita George  MRN: [id number]  : 1985    Chief Complaint   Patient presents with   • Diabetes       History of Present Illness:   Anita George is a 36 y.o. female who presents for follow up for Type 2 diabetes.  She remains on Ozempic 1 mg weekly and Jardiance 25 mg daily.  She reports she had shoulder surgery since her last appointment and this is gone well.  She reports she had her left shoulder scoped due to some pain she was having.  She reports she was less active for a while after the surgery and wonders if this could be related to some of her weight gain but she is frustrated with the 9 pound weight gain.  She reports she did go on a camping trip recently and probably did not need like she should.  She reports overall her blood sugars have been good.  She denies any hypoglycemia.  She is up-to-date on her eye exam she had her appointment in September.  She denies any trouble with her feet today.  She is fasting today to have labs checked.      Subjective     Review of Systems:   Review of Systems   Constitutional: Negative.    Cardiovascular: Negative.    Gastrointestinal: Negative.    Endocrine: Negative.    Neurological: Negative.        The following portions of the patient's history were reviewed and updated as appropriate: allergies, current medications, past family history, past medical history, past social history, past surgical history and problem list.    Objective     Vitals:    22 0908   BP: 120/77   Pulse: 74   SpO2: 99%   Weight: 89.8 kg (198 lb)   Height: 167.6 cm (66\")     Body mass index is 31.96 kg/m².    Physical Exam  Vitals reviewed.   Constitutional:       General: She is not in acute distress.     Appearance: Normal appearance.   Cardiovascular:      Pulses:           Dorsalis pedis pulses are 2+ on the right side and 2+ on the left side.        Posterior tibial pulses are 2+ on the right side and 2+ on the left side. "   Musculoskeletal:      Right foot: No deformity.      Left foot: No deformity.   Feet:      Right foot:      Protective Sensation: 5 sites tested. 5 sites sensed.      Skin integrity: Skin integrity normal.      Toenail Condition: Right toenails are normal.      Left foot:      Protective Sensation: 5 sites tested. 5 sites sensed.      Skin integrity: Skin integrity normal.      Toenail Condition: Left toenails are normal.      Comments: Diabetic Foot Exam Performed and Monofilament Test Performed    Neurological:      Mental Status: She is alert.         HEMOGLOBIN A1C  Lab Results   Component Value Date    HGBA1C 6.6 07/14/2022       GLUCOSE  Glucose   Date Value Ref Range Status   07/14/2022 144 (A) 70 - 130 mg/dL Final       CMP  Lab Results   Component Value Date    GLUCOSE 104 (H) 06/14/2021    BUN 11 06/14/2021    CREATININE 0.87 06/14/2021    EGFRIFNONA 73 06/14/2021    EGFRIFAFRI 101 09/23/2020    BCR 12.6 06/14/2021    K 4.4 06/14/2021    CO2 29.2 (H) 06/14/2021    CALCIUM 9.8 06/14/2021    PROTENTOTREF 7.1 09/23/2020    LABIL2 1.6 09/23/2020    AST 13 06/14/2021    ALT 20 06/14/2021       LIPID PANEL  Lab Results   Component Value Date    CHOL 178 06/14/2021    TRIG 205 (H) 06/14/2021    HDL 36 (L) 06/14/2021     (H) 06/14/2021       URINE MICROALBUMIN/CREATININE RATIO  Microalbumin/Creatinine Ratio   Date Value Ref Range Status   06/14/2021   Final     Comment:     Unable to calculate       TSH  Lab Results   Component Value Date    TSH 1.210 06/14/2021       Assessment / Plan      Assessment & Plan:  1. Type 2 diabetes mellitus without complication, with long-term current use of insulin (HCC)  Her hemoglobin A1c is to goal but is up some as compared to April at 6.6%.  We will try increasing her Ozempic 2 mg to see if this helps promote weight loss.  She will continue Jardiance 25 mg daily.  She will contact me if she has trouble tolerating the increased Ozempic dose if she has any trouble with  hypoglycemia.  I gave her a sample pen of the Ozempic 2 mg dose to try and sent a prescription for this to her pharmacy.  Her blood pressure is okay today.  Her weight is up 9 pounds since her appointment in April.  We discussed that increasing the Ozempic may help promote weight loss.  I encouraged healthy eating habits and physical activity as tolerated.  Her foot exam was okay today.  Fasting labs pending.  Will send note with results and plan.  - POC Glucose, Blood  - POC Glycosylated Hemoglobin (Hb A1C)  - TSH; Future  - Microalbumin / Creatinine Urine Ratio - Urine, Clean Catch; Future  - Lipid Panel; Future  - Comprehensive Metabolic Panel; Future    2. Mixed hyperlipidemia  Fasting labs pending.  Will send note with results and plan.  For now she will continue atorvastatin 40 mg daily.      Return in about 3 months (around 10/14/2022) for Recheck 3-4 mos.     This note was dictated using Dragon voice recognition.    Elvira Daly PA-C  07/14/2022

## 2022-10-11 ENCOUNTER — OFFICE VISIT (OUTPATIENT)
Dept: ENDOCRINOLOGY | Facility: CLINIC | Age: 37
End: 2022-10-11

## 2022-10-11 VITALS
OXYGEN SATURATION: 99 % | WEIGHT: 188 LBS | HEIGHT: 66 IN | HEART RATE: 88 BPM | DIASTOLIC BLOOD PRESSURE: 75 MMHG | BODY MASS INDEX: 30.22 KG/M2 | SYSTOLIC BLOOD PRESSURE: 118 MMHG

## 2022-10-11 DIAGNOSIS — Z79.4 TYPE 2 DIABETES MELLITUS WITHOUT COMPLICATION, WITH LONG-TERM CURRENT USE OF INSULIN: Primary | ICD-10-CM

## 2022-10-11 DIAGNOSIS — E78.2 MIXED HYPERLIPIDEMIA: ICD-10-CM

## 2022-10-11 DIAGNOSIS — E11.9 TYPE 2 DIABETES MELLITUS WITHOUT COMPLICATION, WITH LONG-TERM CURRENT USE OF INSULIN: Primary | ICD-10-CM

## 2022-10-11 LAB
EXPIRATION DATE: NORMAL
EXPIRATION DATE: NORMAL
GLUCOSE BLDC GLUCOMTR-MCNC: 109 MG/DL (ref 70–130)
HBA1C MFR BLD: 5.9 %
Lab: NORMAL
Lab: NORMAL

## 2022-10-11 PROCEDURE — 83036 HEMOGLOBIN GLYCOSYLATED A1C: CPT | Performed by: PHYSICIAN ASSISTANT

## 2022-10-11 PROCEDURE — 99214 OFFICE O/P EST MOD 30 MIN: CPT | Performed by: PHYSICIAN ASSISTANT

## 2022-10-11 PROCEDURE — 82947 ASSAY GLUCOSE BLOOD QUANT: CPT | Performed by: PHYSICIAN ASSISTANT

## 2022-10-11 RX ORDER — ATORVASTATIN CALCIUM 20 MG/1
20 TABLET, FILM COATED ORAL DAILY
Qty: 90 TABLET | Refills: 1 | Status: SHIPPED | OUTPATIENT
Start: 2022-10-11

## 2022-10-11 NOTE — PROGRESS NOTES
"     Office Note      Date: 10/11/2022  Patient Name: Anita George  MRN: [id number]  : 1985    Chief Complaint   Patient presents with   • Diabetes       History of Present Illness:   Anita George is a 37 y.o. female who presents for follow-up for type 2 diabetes.  At her last visit we increased her Ozempic to 2 mg weekly and she has been able to lose about 10 pounds since her appointment in July.  She reports she has tolerated the increased dose with no major issues.  She remains on Jardiance 25 mg daily.  She reports she felt her blood glucose may have been a little low 1 time when she was working out and had continued her fast prior to exercise.  She is up-to-date on her eye exam she had her appointment  and all was good.  She denies any trouble with her feet today we did her foot exam as well as fasting labs last visit.  She reports she has not been on her atorvastatin in months and is asking if she needs to take this today.  She plans to get her flu vaccine at work they typically offer these in October.      Subjective     Review of Systems:   Review of Systems   Constitutional: Negative.    Cardiovascular: Negative.    Gastrointestinal: Negative.    Endocrine: Negative.    Neurological: Negative.        The following portions of the patient's history were reviewed and updated as appropriate: allergies, current medications, past family history, past medical history, past social history, past surgical history and problem list.    Objective     Vitals:    10/11/22 1331   BP: 118/75   Pulse: 88   SpO2: 99%   Weight: 85.3 kg (188 lb)   Height: 167.6 cm (66\")     Body mass index is 30.34 kg/m².    Physical Exam  Vitals reviewed.   Constitutional:       General: She is not in acute distress.     Appearance: Normal appearance.   Neurological:      Mental Status: She is alert.         HEMOGLOBIN A1C  Lab Results   Component Value Date    HGBA1C 5.9 10/11/2022       GLUCOSE  Glucose   Date Value " Ref Range Status   10/11/2022 109 70 - 130 mg/dL Final       CMP  Lab Results   Component Value Date    GLUCOSE 137 (H) 07/14/2022    BUN 11 07/14/2022    CREATININE 0.81 07/14/2022    EGFRIFNONA 73 06/14/2021    EGFRIFAFRI 101 09/23/2020    BCR 13.6 07/14/2022    K 4.3 07/14/2022    CO2 24.4 07/14/2022    CALCIUM 8.9 07/14/2022    PROTENTOTREF 7.1 09/23/2020    LABIL2 1.6 09/23/2020    AST 18 07/14/2022    ALT 25 07/14/2022       LIPID PANEL  Lab Results   Component Value Date    CHOL 160 07/14/2022    TRIG 187 (H) 07/14/2022    HDL 36 (L) 07/14/2022    LDL 92 07/14/2022       URINE MICROALBUMIN/CREATININE RATIO  Microalbumin/Creatinine Ratio   Date Value Ref Range Status   07/14/2022 8.8 mg/g Final       TSH  Lab Results   Component Value Date    TSH 1.220 07/14/2022       Assessment / Plan      Assessment & Plan:  1. Type 2 diabetes mellitus without complication, with long-term current use of insulin (HCC)  Her hemoglobin A1c is 5.9% today.  This is excellent.  For now she will continue Ozempic 2 mg weekly and Jardiance 25 mg daily.  She did not need any refills today.  Her blood pressure is okay today.  Her weight is down 10 pounds since her appointment in July.  I encouraged continued healthy eating habits and physical activity as tolerated.  - POC Glucose, Blood  - POC Glycosylated Hemoglobin (Hb A1C)    2. Mixed hyperlipidemia  Her fasting cholesterol was within normal limits that her LDL cholesterol was 92.  We discussed the goal for this would be less than 70 mg/dL and her triglycerides were mildly elevated with low HDL cholesterol.  We will add back atorvastatin 20 mg daily.  I sent a prescription for this to her pharmacy and we will plan to recheck fasting labs next visit for monitoring.  - atorvastatin (LIPITOR) 20 MG tablet; Take 1 tablet by mouth Daily.  Dispense: 90 tablet; Refill: 1      Return in about 3 months (around 1/11/2023) for Recheck 4 mos, fasting.     This note was dictated using Dragon  voice recognition.    Elvira Daly PA-C  10/11/2022

## 2022-10-20 ENCOUNTER — CLINICAL SUPPORT (OUTPATIENT)
Dept: FAMILY MEDICINE CLINIC | Facility: OTHER | Age: 37
End: 2022-10-20

## 2022-10-20 DIAGNOSIS — Z23 FLU VACCINE NEED: Primary | ICD-10-CM

## 2022-10-21 PROCEDURE — 90686 IIV4 VACC NO PRSV 0.5 ML IM: CPT | Performed by: PHYSICIAN ASSISTANT

## 2022-10-21 PROCEDURE — 90471 IMMUNIZATION ADMIN: CPT | Performed by: PHYSICIAN ASSISTANT

## 2023-02-05 ENCOUNTER — HOSPITAL ENCOUNTER (EMERGENCY)
Age: 38
Discharge: HOME | End: 2023-02-05
Payer: COMMERCIAL

## 2023-02-05 VITALS
RESPIRATION RATE: 20 BRPM | OXYGEN SATURATION: 100 % | SYSTOLIC BLOOD PRESSURE: 126 MMHG | DIASTOLIC BLOOD PRESSURE: 80 MMHG | HEART RATE: 82 BPM | TEMPERATURE: 98.5 F

## 2023-02-05 VITALS
SYSTOLIC BLOOD PRESSURE: 190 MMHG | DIASTOLIC BLOOD PRESSURE: 80 MMHG | OXYGEN SATURATION: 100 % | TEMPERATURE: 98.5 F | HEART RATE: 82 BPM | RESPIRATION RATE: 20 BRPM

## 2023-02-05 VITALS — BODY MASS INDEX: 25.7 KG/M2

## 2023-02-05 DIAGNOSIS — H66.90: Primary | ICD-10-CM

## 2023-02-05 PROCEDURE — G0463 HOSPITAL OUTPT CLINIC VISIT: HCPCS

## 2023-02-05 PROCEDURE — 99213 OFFICE O/P EST LOW 20 MIN: CPT

## 2023-02-05 PROCEDURE — 99212 OFFICE O/P EST SF 10 MIN: CPT

## 2023-02-20 ENCOUNTER — OFFICE VISIT (OUTPATIENT)
Dept: ENDOCRINOLOGY | Facility: CLINIC | Age: 38
End: 2023-02-20
Payer: COMMERCIAL

## 2023-02-20 VITALS
HEIGHT: 66 IN | SYSTOLIC BLOOD PRESSURE: 122 MMHG | OXYGEN SATURATION: 98 % | DIASTOLIC BLOOD PRESSURE: 76 MMHG | HEART RATE: 64 BPM | BODY MASS INDEX: 28.77 KG/M2 | WEIGHT: 179 LBS

## 2023-02-20 DIAGNOSIS — E78.2 MIXED HYPERLIPIDEMIA: ICD-10-CM

## 2023-02-20 DIAGNOSIS — Z79.4 TYPE 2 DIABETES MELLITUS WITHOUT COMPLICATION, WITH LONG-TERM CURRENT USE OF INSULIN: Primary | ICD-10-CM

## 2023-02-20 DIAGNOSIS — E11.9 TYPE 2 DIABETES MELLITUS WITHOUT COMPLICATION, WITH LONG-TERM CURRENT USE OF INSULIN: Primary | ICD-10-CM

## 2023-02-20 LAB
EXPIRATION DATE: NORMAL
EXPIRATION DATE: NORMAL
GLUCOSE BLDC GLUCOMTR-MCNC: 84 MG/DL (ref 70–130)
HBA1C MFR BLD: 6 %
Lab: NORMAL
Lab: NORMAL

## 2023-02-20 PROCEDURE — 80061 LIPID PANEL: CPT | Performed by: PHYSICIAN ASSISTANT

## 2023-02-20 PROCEDURE — 82947 ASSAY GLUCOSE BLOOD QUANT: CPT | Performed by: PHYSICIAN ASSISTANT

## 2023-02-20 PROCEDURE — 99214 OFFICE O/P EST MOD 30 MIN: CPT | Performed by: PHYSICIAN ASSISTANT

## 2023-02-20 PROCEDURE — 83036 HEMOGLOBIN GLYCOSYLATED A1C: CPT | Performed by: PHYSICIAN ASSISTANT

## 2023-02-20 PROCEDURE — 80053 COMPREHEN METABOLIC PANEL: CPT | Performed by: PHYSICIAN ASSISTANT

## 2023-02-20 RX ORDER — DROSPIRENONE AND ESTETROL 3-14.2(28)
KIT ORAL
COMMUNITY
Start: 2023-01-27

## 2023-02-20 NOTE — PROGRESS NOTES
"     Office Note      Date: 2023  Patient Name: Anita George  MRN: [id number]  : 1985    Chief Complaint   Patient presents with   • Diabetes       History of Present Illness:   Anita George is a 37 y.o. female who presents for follow-up for type 2 diabetes.  She remains on Ozempic 2 mg weekly and Jardiance 25 mg daily.  She reports she has not been taking the Ozempic as regularly as she should she has been forgetting a few doses.  She reports she just got back from [place] for a week and is not surprised her weight is not down more.  Prior to [place] she was on a steroid pack and antibiotics for a bad ear infection.  She reports she checks her fasting blood sugar once a week and this is typically in the low 100s.  She reports she is taking her atorvastatin 20 mg regularly.  She is fasting today for labs.  She is up-to-date on her eye exam she had her appointment in September.  She denies any trouble with her feet today.  She will be due for her foot exam next visit.      Subjective     Review of Systems:   Review of Systems   Constitutional: Negative.    Cardiovascular: Negative.    Gastrointestinal: Negative.    Endocrine: Negative.    Neurological: Negative.        The following portions of the patient's history were reviewed and updated as appropriate: allergies, current medications, past family history, past medical history, past social history, past surgical history and problem list.    Objective     Vitals:    23 1031   BP: 122/76   Pulse: 64   SpO2: 98%   Weight: 81.2 kg (179 lb)   Height: 167.6 cm (66\")     Body mass index is 28.89 kg/m².    Physical Exam  Vitals reviewed.   Constitutional:       General: She is not in acute distress.     Appearance: Normal appearance.   Neurological:      Mental Status: She is alert.         HEMOGLOBIN A1C  Lab Results   Component Value Date    HGBA1C 6.0 2023       GLUCOSE  Glucose   Date Value Ref Range Status   2023 84 70 - 130 mg/dL " Final       CMP  Lab Results   Component Value Date    GLUCOSE 137 (H) 07/14/2022    BUN 11 07/14/2022    CREATININE 0.81 07/14/2022    EGFRIFNONA 73 06/14/2021    EGFRIFAFRI 101 09/23/2020    BCR 13.6 07/14/2022    K 4.3 07/14/2022    CO2 24.4 07/14/2022    CALCIUM 8.9 07/14/2022    PROTENTOTREF 7.1 09/23/2020    LABIL2 1.6 09/23/2020    AST 18 07/14/2022    ALT 25 07/14/2022       LIPID PANEL  Lab Results   Component Value Date    CHOL 160 07/14/2022    TRIG 187 (H) 07/14/2022    HDL 36 (L) 07/14/2022    LDL 92 07/14/2022       URINE MICROALBUMIN/CREATININE RATIO  Microalbumin/Creatinine Ratio   Date Value Ref Range Status   07/14/2022 8.8 mg/g Final       TSH  Lab Results   Component Value Date    TSH 1.220 07/14/2022       Assessment / Plan      Assessment & Plan:  1. Type 2 diabetes mellitus without complication, with long-term current use of insulin (HCC)  Her hemoglobin A1c today is 6.0%.  This is slightly up as compared to October but still excellent.  For now she will continue Ozempic 2 mg weekly and Jardiance 25 mg daily.  She did not need any prescriptions today.  Her blood pressure is okay today.  Her weight is down 9 pounds since her appointment in October.  I congratulated her on her efforts.  She will continue healthy eating habits and physical activity as tolerated.  CMP and fasting lipids pending today back on the atorvastatin.  Will send note with results and plan.  - POC Glucose, Blood  - POC Glycosylated Hemoglobin (Hb A1C)  - Lipid Panel; Future  - Comprehensive Metabolic Panel; Future      2. Mixed hyperlipidemia  CMP and fasting lipids pending back on the atorvastatin 20 mg daily.  Will send note with results and plan.  For now she will continue atorvastatin 20 mg daily.  - Lipid Panel; Future  - Comprehensive Metabolic Panel; Future        Return in about 5 months (around 7/20/2023) for Recheck 4-5 mos.     This note was dictated using Dragon voice recognition.    Elvira Daly,  JOSIAH  02/20/2023

## 2023-02-21 LAB
ALBUMIN SERPL-MCNC: 4.1 G/DL (ref 3.5–5.2)
ALBUMIN/GLOB SERPL: 1.5 G/DL
ALP SERPL-CCNC: 57 U/L (ref 39–117)
ALT SERPL W P-5'-P-CCNC: 22 U/L (ref 1–33)
ANION GAP SERPL CALCULATED.3IONS-SCNC: 12.8 MMOL/L (ref 5–15)
AST SERPL-CCNC: 19 U/L (ref 1–32)
BILIRUB SERPL-MCNC: 0.3 MG/DL (ref 0–1.2)
BUN SERPL-MCNC: 12 MG/DL (ref 6–20)
BUN/CREAT SERPL: 15.8 (ref 7–25)
CALCIUM SPEC-SCNC: 9.1 MG/DL (ref 8.6–10.5)
CHLORIDE SERPL-SCNC: 103 MMOL/L (ref 98–107)
CHOLEST SERPL-MCNC: 210 MG/DL (ref 0–200)
CO2 SERPL-SCNC: 25.2 MMOL/L (ref 22–29)
CREAT SERPL-MCNC: 0.76 MG/DL (ref 0.57–1)
EGFRCR SERPLBLD CKD-EPI 2021: 102.4 ML/MIN/1.73
GLOBULIN UR ELPH-MCNC: 2.8 GM/DL
GLUCOSE SERPL-MCNC: 106 MG/DL (ref 65–99)
HDLC SERPL-MCNC: 38 MG/DL (ref 40–60)
LDLC SERPL CALC-MCNC: 105 MG/DL (ref 0–100)
LDLC/HDLC SERPL: 2.46 {RATIO}
POTASSIUM SERPL-SCNC: 4.3 MMOL/L (ref 3.5–5.2)
PROT SERPL-MCNC: 6.9 G/DL (ref 6–8.5)
SODIUM SERPL-SCNC: 141 MMOL/L (ref 136–145)
TRIGL SERPL-MCNC: 393 MG/DL (ref 0–150)
VLDLC SERPL-MCNC: 67 MG/DL (ref 5–40)

## 2023-04-05 ENCOUNTER — LAB (OUTPATIENT)
Dept: LAB | Facility: HOSPITAL | Age: 38
End: 2023-04-05
Payer: COMMERCIAL

## 2023-04-05 ENCOUNTER — TRANSCRIBE ORDERS (OUTPATIENT)
Dept: LAB | Facility: HOSPITAL | Age: 38
End: 2023-04-05
Payer: COMMERCIAL

## 2023-04-05 DIAGNOSIS — N93.9 HEMORRHAGE IN UTERUS: Primary | ICD-10-CM

## 2023-04-05 DIAGNOSIS — N93.9 HEMORRHAGE IN UTERUS: ICD-10-CM

## 2023-04-05 LAB
ALBUMIN SERPL-MCNC: 4.3 G/DL (ref 3.5–5.2)
ALBUMIN/GLOB SERPL: 1.4 G/DL
ALP SERPL-CCNC: 56 U/L (ref 39–117)
ALT SERPL W P-5'-P-CCNC: 9 U/L (ref 1–33)
ANION GAP SERPL CALCULATED.3IONS-SCNC: 11.3 MMOL/L (ref 5–15)
AST SERPL-CCNC: 17 U/L (ref 1–32)
BASOPHILS # BLD AUTO: 0.06 10*3/MM3 (ref 0–0.2)
BASOPHILS NFR BLD AUTO: 0.6 % (ref 0–1.5)
BILIRUB SERPL-MCNC: 0.4 MG/DL (ref 0–1.2)
BUN SERPL-MCNC: 14 MG/DL (ref 6–20)
BUN/CREAT SERPL: 14.6 (ref 7–25)
CALCIUM SPEC-SCNC: 9.8 MG/DL (ref 8.6–10.5)
CHLORIDE SERPL-SCNC: 107 MMOL/L (ref 98–107)
CO2 SERPL-SCNC: 23.7 MMOL/L (ref 22–29)
CREAT SERPL-MCNC: 0.96 MG/DL (ref 0.57–1)
DEPRECATED RDW RBC AUTO: 40 FL (ref 37–54)
EGFRCR SERPLBLD CKD-EPI 2021: 77.3 ML/MIN/1.73
EOSINOPHIL # BLD AUTO: 0.13 10*3/MM3 (ref 0–0.4)
EOSINOPHIL NFR BLD AUTO: 1.3 % (ref 0.3–6.2)
ERYTHROCYTE [DISTWIDTH] IN BLOOD BY AUTOMATED COUNT: 12.2 % (ref 12.3–15.4)
ESTRADIOL SERPL HS-MCNC: 25.6 PG/ML
FSH SERPL-ACNC: 4.01 MIU/ML
GLOBULIN UR ELPH-MCNC: 3 GM/DL
GLUCOSE SERPL-MCNC: 101 MG/DL (ref 65–99)
HCT VFR BLD AUTO: 44.7 % (ref 34–46.6)
HGB BLD-MCNC: 14.5 G/DL (ref 12–15.9)
IMM GRANULOCYTES # BLD AUTO: 0.02 10*3/MM3 (ref 0–0.05)
IMM GRANULOCYTES NFR BLD AUTO: 0.2 % (ref 0–0.5)
LYMPHOCYTES # BLD AUTO: 4.37 10*3/MM3 (ref 0.7–3.1)
LYMPHOCYTES NFR BLD AUTO: 43.1 % (ref 19.6–45.3)
MCH RBC QN AUTO: 28.9 PG (ref 26.6–33)
MCHC RBC AUTO-ENTMCNC: 32.4 G/DL (ref 31.5–35.7)
MCV RBC AUTO: 89.2 FL (ref 79–97)
MONOCYTES # BLD AUTO: 0.49 10*3/MM3 (ref 0.1–0.9)
MONOCYTES NFR BLD AUTO: 4.8 % (ref 5–12)
NEUTROPHILS NFR BLD AUTO: 5.06 10*3/MM3 (ref 1.7–7)
NEUTROPHILS NFR BLD AUTO: 50 % (ref 42.7–76)
NRBC BLD AUTO-RTO: 0 /100 WBC (ref 0–0.2)
PLATELET # BLD AUTO: 418 10*3/MM3 (ref 140–450)
PMV BLD AUTO: 9.6 FL (ref 6–12)
POTASSIUM SERPL-SCNC: 4.2 MMOL/L (ref 3.5–5.2)
PROT SERPL-MCNC: 7.3 G/DL (ref 6–8.5)
RBC # BLD AUTO: 5.01 10*6/MM3 (ref 3.77–5.28)
SODIUM SERPL-SCNC: 142 MMOL/L (ref 136–145)
TSH SERPL DL<=0.05 MIU/L-ACNC: 1.96 UIU/ML (ref 0.27–4.2)
WBC NRBC COR # BLD: 10.13 10*3/MM3 (ref 3.4–10.8)

## 2023-04-05 PROCEDURE — 36415 COLL VENOUS BLD VENIPUNCTURE: CPT | Performed by: NURSE PRACTITIONER

## 2023-04-05 PROCEDURE — 82670 ASSAY OF TOTAL ESTRADIOL: CPT | Performed by: NURSE PRACTITIONER

## 2023-04-05 PROCEDURE — 83001 ASSAY OF GONADOTROPIN (FSH): CPT

## 2023-04-05 PROCEDURE — 80050 GENERAL HEALTH PANEL: CPT | Performed by: NURSE PRACTITIONER

## 2023-04-18 ENCOUNTER — TRANSCRIBE ORDERS (OUTPATIENT)
Dept: ADMINISTRATIVE | Facility: HOSPITAL | Age: 38
End: 2023-04-18
Payer: COMMERCIAL

## 2023-04-18 DIAGNOSIS — Z80.3 FAMILY HISTORY OF BREAST CANCER: Primary | ICD-10-CM

## 2023-05-01 DIAGNOSIS — E78.2 MIXED HYPERLIPIDEMIA: ICD-10-CM

## 2023-05-01 RX ORDER — ATORVASTATIN CALCIUM 20 MG/1
20 TABLET, FILM COATED ORAL DAILY
Qty: 90 TABLET | Refills: 1 | Status: SHIPPED | OUTPATIENT
Start: 2023-05-01

## 2023-05-04 ENCOUNTER — PREP FOR SURGERY (OUTPATIENT)
Dept: OTHER | Facility: HOSPITAL | Age: 38
End: 2023-05-04
Payer: COMMERCIAL

## 2023-05-04 DIAGNOSIS — N99.85 POST ENDOMETRIAL ABLATION SYNDROME: Primary | ICD-10-CM

## 2023-05-04 RX ORDER — SODIUM CHLORIDE 9 MG/ML
40 INJECTION, SOLUTION INTRAVENOUS AS NEEDED
OUTPATIENT
Start: 2023-05-04

## 2023-05-04 RX ORDER — HEPARIN SODIUM 5000 [USP'U]/ML
5000 INJECTION, SOLUTION INTRAVENOUS; SUBCUTANEOUS ONCE
OUTPATIENT
Start: 2023-05-04 | End: 2023-05-04

## 2023-05-04 RX ORDER — SODIUM CHLORIDE 0.9 % (FLUSH) 0.9 %
3 SYRINGE (ML) INJECTION EVERY 12 HOURS SCHEDULED
OUTPATIENT
Start: 2023-05-04

## 2023-05-04 RX ORDER — GABAPENTIN 300 MG/1
600 CAPSULE ORAL ONCE
OUTPATIENT
Start: 2023-05-04 | End: 2023-05-04

## 2023-05-04 RX ORDER — SODIUM CHLORIDE 0.9 % (FLUSH) 0.9 %
10 SYRINGE (ML) INJECTION AS NEEDED
OUTPATIENT
Start: 2023-05-04

## 2023-05-04 RX ORDER — CEFAZOLIN SODIUM 2 G/100ML
2 INJECTION, SOLUTION INTRAVENOUS ONCE
OUTPATIENT
Start: 2023-05-04 | End: 2023-05-04

## 2023-05-04 RX ORDER — ACETAMINOPHEN 500 MG
1000 TABLET ORAL ONCE
OUTPATIENT
Start: 2023-05-04 | End: 2023-05-04

## 2023-05-30 ENCOUNTER — PRE-ADMISSION TESTING (OUTPATIENT)
Dept: PREADMISSION TESTING | Facility: HOSPITAL | Age: 38
End: 2023-05-30
Payer: COMMERCIAL

## 2023-05-30 VITALS — HEIGHT: 66 IN | BODY MASS INDEX: 29.9 KG/M2 | WEIGHT: 186.07 LBS

## 2023-05-30 DIAGNOSIS — N99.85 POST ENDOMETRIAL ABLATION SYNDROME: ICD-10-CM

## 2023-05-30 LAB
BASOPHILS # BLD AUTO: 0.05 10*3/MM3 (ref 0–0.2)
BASOPHILS NFR BLD AUTO: 0.5 % (ref 0–1.5)
BILIRUB UR QL STRIP: NEGATIVE
CLARITY UR: CLEAR
COLOR UR: YELLOW
DEPRECATED RDW RBC AUTO: 38.8 FL (ref 37–54)
EOSINOPHIL # BLD AUTO: 0.16 10*3/MM3 (ref 0–0.4)
EOSINOPHIL NFR BLD AUTO: 1.6 % (ref 0.3–6.2)
ERYTHROCYTE [DISTWIDTH] IN BLOOD BY AUTOMATED COUNT: 12 % (ref 12.3–15.4)
GLUCOSE UR STRIP-MCNC: NEGATIVE MG/DL
HBA1C MFR BLD: 6.4 % (ref 4.8–5.6)
HCT VFR BLD AUTO: 42.2 % (ref 34–46.6)
HGB BLD-MCNC: 14 G/DL (ref 12–15.9)
HGB UR QL STRIP.AUTO: NEGATIVE
IMM GRANULOCYTES # BLD AUTO: 0.05 10*3/MM3 (ref 0–0.05)
IMM GRANULOCYTES NFR BLD AUTO: 0.5 % (ref 0–0.5)
KETONES UR QL STRIP: NEGATIVE
LEUKOCYTE ESTERASE UR QL STRIP.AUTO: NEGATIVE
LYMPHOCYTES # BLD AUTO: 4.01 10*3/MM3 (ref 0.7–3.1)
LYMPHOCYTES NFR BLD AUTO: 40.6 % (ref 19.6–45.3)
MCH RBC QN AUTO: 29.2 PG (ref 26.6–33)
MCHC RBC AUTO-ENTMCNC: 33.2 G/DL (ref 31.5–35.7)
MCV RBC AUTO: 88.1 FL (ref 79–97)
MONOCYTES # BLD AUTO: 0.45 10*3/MM3 (ref 0.1–0.9)
MONOCYTES NFR BLD AUTO: 4.6 % (ref 5–12)
NEUTROPHILS NFR BLD AUTO: 5.15 10*3/MM3 (ref 1.7–7)
NEUTROPHILS NFR BLD AUTO: 52.2 % (ref 42.7–76)
NITRITE UR QL STRIP: NEGATIVE
NRBC BLD AUTO-RTO: 0 /100 WBC (ref 0–0.2)
PH UR STRIP.AUTO: 6.5 [PH] (ref 5–8)
PLATELET # BLD AUTO: 388 10*3/MM3 (ref 140–450)
PMV BLD AUTO: 9.3 FL (ref 6–12)
POTASSIUM SERPL-SCNC: 4.9 MMOL/L (ref 3.5–5.2)
PROT UR QL STRIP: NEGATIVE
QT INTERVAL: 414 MS
QTC INTERVAL: 449 MS
RBC # BLD AUTO: 4.79 10*6/MM3 (ref 3.77–5.28)
SP GR UR STRIP: 1.02 (ref 1–1.03)
UROBILINOGEN UR QL STRIP: NORMAL
WBC NRBC COR # BLD: 9.87 10*3/MM3 (ref 3.4–10.8)

## 2023-05-30 PROCEDURE — 83036 HEMOGLOBIN GLYCOSYLATED A1C: CPT

## 2023-05-30 PROCEDURE — 36415 COLL VENOUS BLD VENIPUNCTURE: CPT

## 2023-05-30 PROCEDURE — 93005 ELECTROCARDIOGRAM TRACING: CPT

## 2023-05-30 PROCEDURE — 81003 URINALYSIS AUTO W/O SCOPE: CPT

## 2023-05-30 PROCEDURE — 84132 ASSAY OF SERUM POTASSIUM: CPT

## 2023-05-30 PROCEDURE — 93010 ELECTROCARDIOGRAM REPORT: CPT | Performed by: INTERNAL MEDICINE

## 2023-05-30 PROCEDURE — 85025 COMPLETE CBC W/AUTO DIFF WBC: CPT

## 2023-05-30 NOTE — PAT
An arrival time for procedure was not provided during PAT visit. If patient had any questions or concerns about their arrival time, they were instructed to contact their surgeon/physician.  Additionally, if the patient referred to an arrival time that was acquired from their my chart account, patient was encouraged to verify that time with their surgeon/physician. Arrival times are NOT provided in Pre Admission Testing Department.    Patient viewed general PAT education video as instructed in their preoperative information received from their surgeon.  Patient stated the general PAT education video was viewed in its entirety and survey completed.  Copies of PAT general education handouts (Incentive Spirometry, Meds to Beds Program, Patient Belongings, Pre-op skin preparation instructions, Blood Glucose testing, Visitor policy, Surgery FAQ, Code H) distributed to patient if not printed. Education related to the PAT pass and skin preparation for surgery (if applicable) completed in PAT as a reinforcement to PAT education video. Patient instructed to return PAT pass provided today as well as completed skin preparation sheet (if applicable) on the day of procedure.     Additionally if patient had not viewed video yet but intended to view it at home or in our waiting area, then referred them to the handout with QR code/link provided during PAT visit.  Instructed patient to complete survey after viewing the video in its entirety.  Encouraged patient/family to read PAT general education handouts thoroughly and notify PAT staff with any questions or concerns. Patient verbalized understanding of all information and priority content.    Patient to apply Chlorhexadine wipes  to surgical area (as instructed) the night before procedure and the AM of procedure. Wipes provided.    Patient instructed to drink 20 ounces of Gatorade and it needs to be completed 1 hour (for Main OR patients) or 2 hours (scheduled  section &  [facility] patients) before given arrival time for procedure (NO RED Gatorade)    Patient verbalized understanding.    Instructed patient to take two Tylenol extra strength (total of 1000 mg) the night before surgery.    Patient denies any current skin issues.

## 2023-05-31 ENCOUNTER — ANESTHESIA EVENT (OUTPATIENT)
Dept: PERIOP | Facility: HOSPITAL | Age: 38
End: 2023-05-31
Payer: COMMERCIAL

## 2023-05-31 RX ORDER — FAMOTIDINE 10 MG/ML
20 INJECTION, SOLUTION INTRAVENOUS ONCE
Status: CANCELLED | OUTPATIENT
Start: 2023-05-31 | End: 2023-05-31

## 2023-06-01 ENCOUNTER — ANESTHESIA (OUTPATIENT)
Dept: PERIOP | Facility: HOSPITAL | Age: 38
End: 2023-06-01
Payer: COMMERCIAL

## 2023-06-01 ENCOUNTER — HOSPITAL ENCOUNTER (OUTPATIENT)
Facility: HOSPITAL | Age: 38
Discharge: HOME | End: 2023-06-01
Attending: OBSTETRICS & GYNECOLOGY | Admitting: OBSTETRICS & GYNECOLOGY
Payer: COMMERCIAL

## 2023-06-01 VITALS
OXYGEN SATURATION: 98 % | HEART RATE: 60 BPM | SYSTOLIC BLOOD PRESSURE: 103 MMHG | RESPIRATION RATE: 16 BRPM | DIASTOLIC BLOOD PRESSURE: 67 MMHG | TEMPERATURE: 98.6 F

## 2023-06-01 DIAGNOSIS — N99.85 POST ENDOMETRIAL ABLATION SYNDROME: Primary | ICD-10-CM

## 2023-06-01 LAB
B-HCG UR QL: NEGATIVE
EXPIRATION DATE: NORMAL
GLUCOSE BLDC GLUCOMTR-MCNC: 133 MG/DL (ref 70–130)
GLUCOSE BLDC GLUCOMTR-MCNC: 215 MG/DL (ref 70–130)
INTERNAL NEGATIVE CONTROL: NEGATIVE
INTERNAL POSITIVE CONTROL: POSITIVE
Lab: NORMAL

## 2023-06-01 PROCEDURE — 25010000002 ONDANSETRON PER 1 MG: Performed by: NURSE ANESTHETIST, CERTIFIED REGISTERED

## 2023-06-01 PROCEDURE — 25010000002 SUGAMMADEX 200 MG/2ML SOLUTION: Performed by: NURSE ANESTHETIST, CERTIFIED REGISTERED

## 2023-06-01 PROCEDURE — 25010000002 HEPARIN (PORCINE) PER 1000 UNITS: Performed by: OBSTETRICS & GYNECOLOGY

## 2023-06-01 PROCEDURE — 25010000002 DROPERIDOL PER 5 MG

## 2023-06-01 PROCEDURE — 88307 TISSUE EXAM BY PATHOLOGIST: CPT | Performed by: OBSTETRICS & GYNECOLOGY

## 2023-06-01 PROCEDURE — 81025 URINE PREGNANCY TEST: CPT | Performed by: ANESTHESIOLOGY

## 2023-06-01 PROCEDURE — 82948 REAGENT STRIP/BLOOD GLUCOSE: CPT

## 2023-06-01 PROCEDURE — 25010000002 MIDAZOLAM PER 1 MG: Performed by: ANESTHESIOLOGY

## 2023-06-01 PROCEDURE — 25010000002 FENTANYL CITRATE (PF) 100 MCG/2ML SOLUTION: Performed by: NURSE ANESTHETIST, CERTIFIED REGISTERED

## 2023-06-01 PROCEDURE — 25010000002 KETOROLAC TROMETHAMINE PER 15 MG: Performed by: NURSE ANESTHETIST, CERTIFIED REGISTERED

## 2023-06-01 PROCEDURE — 25010000002 PROPOFOL 10 MG/ML EMULSION: Performed by: NURSE ANESTHETIST, CERTIFIED REGISTERED

## 2023-06-01 PROCEDURE — 25010000002 HYDROMORPHONE 1 MG/ML SOLUTION

## 2023-06-01 PROCEDURE — 25010000002 CEFAZOLIN IN DEXTROSE 2-4 GM/100ML-% SOLUTION: Performed by: OBSTETRICS & GYNECOLOGY

## 2023-06-01 PROCEDURE — 25010000002 DEXAMETHASONE PER 1 MG: Performed by: NURSE ANESTHETIST, CERTIFIED REGISTERED

## 2023-06-01 PROCEDURE — 25010000002 FENTANYL CITRATE (PF) 50 MCG/ML SOLUTION

## 2023-06-01 RX ORDER — SODIUM CHLORIDE 0.9 % (FLUSH) 0.9 %
10 SYRINGE (ML) INJECTION EVERY 12 HOURS SCHEDULED
Status: DISCONTINUED | OUTPATIENT
Start: 2023-06-01 | End: 2023-06-01 | Stop reason: HOSPADM

## 2023-06-01 RX ORDER — FAMOTIDINE 10 MG/ML
20 INJECTION, SOLUTION INTRAVENOUS ONCE
Status: DISCONTINUED | OUTPATIENT
Start: 2023-06-01 | End: 2023-06-01 | Stop reason: HOSPADM

## 2023-06-01 RX ORDER — SODIUM CHLORIDE 0.9 % (FLUSH) 0.9 %
10 SYRINGE (ML) INJECTION AS NEEDED
Status: DISCONTINUED | OUTPATIENT
Start: 2023-06-01 | End: 2023-06-01 | Stop reason: HOSPADM

## 2023-06-01 RX ORDER — FAMOTIDINE 20 MG/1
20 TABLET, FILM COATED ORAL ONCE
Status: DISCONTINUED | OUTPATIENT
Start: 2023-06-01 | End: 2023-06-01 | Stop reason: HOSPADM

## 2023-06-01 RX ORDER — DEXAMETHASONE SODIUM PHOSPHATE 4 MG/ML
INJECTION, SOLUTION INTRA-ARTICULAR; INTRALESIONAL; INTRAMUSCULAR; INTRAVENOUS; SOFT TISSUE AS NEEDED
Status: DISCONTINUED | OUTPATIENT
Start: 2023-06-01 | End: 2023-06-01 | Stop reason: SURG

## 2023-06-01 RX ORDER — FENTANYL CITRATE 50 UG/ML
50 INJECTION, SOLUTION INTRAMUSCULAR; INTRAVENOUS
Status: DISCONTINUED | OUTPATIENT
Start: 2023-06-01 | End: 2023-06-01 | Stop reason: HOSPADM

## 2023-06-01 RX ORDER — ACETAMINOPHEN 325 MG/1
650 TABLET ORAL EVERY 4 HOURS PRN
Qty: 100 TABLET | Refills: 0 | Status: SHIPPED | OUTPATIENT
Start: 2023-06-01

## 2023-06-01 RX ORDER — MELOXICAM 15 MG/1
7.5 TABLET ORAL ONCE AS NEEDED
Status: DISCONTINUED | OUTPATIENT
Start: 2023-06-01 | End: 2023-06-01 | Stop reason: HOSPADM

## 2023-06-01 RX ORDER — SODIUM CHLORIDE 9 MG/ML
40 INJECTION, SOLUTION INTRAVENOUS AS NEEDED
Status: DISCONTINUED | OUTPATIENT
Start: 2023-06-01 | End: 2023-06-01 | Stop reason: HOSPADM

## 2023-06-01 RX ORDER — ONDANSETRON 2 MG/ML
INJECTION INTRAMUSCULAR; INTRAVENOUS AS NEEDED
Status: DISCONTINUED | OUTPATIENT
Start: 2023-06-01 | End: 2023-06-01 | Stop reason: SURG

## 2023-06-01 RX ORDER — HEPARIN SODIUM 5000 [USP'U]/ML
INJECTION, SOLUTION INTRAVENOUS; SUBCUTANEOUS AS NEEDED
Status: DISCONTINUED | OUTPATIENT
Start: 2023-06-01 | End: 2023-06-01 | Stop reason: HOSPADM

## 2023-06-01 RX ORDER — OXYCODONE HYDROCHLORIDE 5 MG/1
TABLET ORAL
Status: COMPLETED
Start: 2023-06-01 | End: 2023-06-01

## 2023-06-01 RX ORDER — DROPERIDOL 2.5 MG/ML
INJECTION, SOLUTION INTRAMUSCULAR; INTRAVENOUS
Status: COMPLETED
Start: 2023-06-01 | End: 2023-06-01

## 2023-06-01 RX ORDER — DROPERIDOL 2.5 MG/ML
0.62 INJECTION, SOLUTION INTRAMUSCULAR; INTRAVENOUS ONCE AS NEEDED
Status: DISCONTINUED | OUTPATIENT
Start: 2023-06-01 | End: 2023-06-01 | Stop reason: HOSPADM

## 2023-06-01 RX ORDER — MIDAZOLAM HYDROCHLORIDE 1 MG/ML
1 INJECTION INTRAMUSCULAR; INTRAVENOUS
Status: DISCONTINUED | OUTPATIENT
Start: 2023-06-01 | End: 2023-06-01 | Stop reason: HOSPADM

## 2023-06-01 RX ORDER — FENTANYL CITRATE 50 UG/ML
INJECTION, SOLUTION INTRAMUSCULAR; INTRAVENOUS
Status: COMPLETED
Start: 2023-06-01 | End: 2023-06-01

## 2023-06-01 RX ORDER — ACETAMINOPHEN 325 MG/1
650 TABLET ORAL ONCE
Status: DISCONTINUED | OUTPATIENT
Start: 2023-06-01 | End: 2023-06-01 | Stop reason: HOSPADM

## 2023-06-01 RX ORDER — ROCURONIUM BROMIDE 10 MG/ML
INJECTION, SOLUTION INTRAVENOUS AS NEEDED
Status: DISCONTINUED | OUTPATIENT
Start: 2023-06-01 | End: 2023-06-01 | Stop reason: SURG

## 2023-06-01 RX ORDER — SODIUM CHLORIDE 9 MG/ML
INJECTION, SOLUTION INTRAVENOUS AS NEEDED
Status: DISCONTINUED | OUTPATIENT
Start: 2023-06-01 | End: 2023-06-01 | Stop reason: HOSPADM

## 2023-06-01 RX ORDER — HEPARIN SODIUM 5000 [USP'U]/ML
5000 INJECTION, SOLUTION INTRAVENOUS; SUBCUTANEOUS ONCE
Status: DISCONTINUED | OUTPATIENT
Start: 2023-06-01 | End: 2023-06-01 | Stop reason: HOSPADM

## 2023-06-01 RX ORDER — DROPERIDOL 2.5 MG/ML
0.62 INJECTION, SOLUTION INTRAMUSCULAR; INTRAVENOUS
Status: DISCONTINUED | OUTPATIENT
Start: 2023-06-01 | End: 2023-06-01 | Stop reason: HOSPADM

## 2023-06-01 RX ORDER — FENTANYL CITRATE 50 UG/ML
INJECTION, SOLUTION INTRAMUSCULAR; INTRAVENOUS AS NEEDED
Status: DISCONTINUED | OUTPATIENT
Start: 2023-06-01 | End: 2023-06-01 | Stop reason: SURG

## 2023-06-01 RX ORDER — OXYCODONE HYDROCHLORIDE 5 MG/1
5 TABLET ORAL ONCE
Status: COMPLETED | OUTPATIENT
Start: 2023-06-01 | End: 2023-06-01

## 2023-06-01 RX ORDER — LIDOCAINE HYDROCHLORIDE 10 MG/ML
0.5 INJECTION, SOLUTION EPIDURAL; INFILTRATION; INTRACAUDAL; PERINEURAL ONCE AS NEEDED
Status: DISCONTINUED | OUTPATIENT
Start: 2023-06-01 | End: 2023-06-01 | Stop reason: HOSPADM

## 2023-06-01 RX ORDER — KETOROLAC TROMETHAMINE 30 MG/ML
INJECTION, SOLUTION INTRAMUSCULAR; INTRAVENOUS AS NEEDED
Status: DISCONTINUED | OUTPATIENT
Start: 2023-06-01 | End: 2023-06-01 | Stop reason: SURG

## 2023-06-01 RX ORDER — SODIUM CHLORIDE, SODIUM LACTATE, POTASSIUM CHLORIDE, CALCIUM CHLORIDE 600; 310; 30; 20 MG/100ML; MG/100ML; MG/100ML; MG/100ML
9 INJECTION, SOLUTION INTRAVENOUS CONTINUOUS
Status: DISCONTINUED | OUTPATIENT
Start: 2023-06-01 | End: 2023-06-01 | Stop reason: HOSPADM

## 2023-06-01 RX ORDER — MIDAZOLAM HYDROCHLORIDE 1 MG/ML
2 INJECTION INTRAMUSCULAR; INTRAVENOUS ONCE
Status: COMPLETED | OUTPATIENT
Start: 2023-06-01 | End: 2023-06-01

## 2023-06-01 RX ORDER — BUPIVACAINE HYDROCHLORIDE AND EPINEPHRINE 5; 5 MG/ML; UG/ML
INJECTION, SOLUTION PERINEURAL AS NEEDED
Status: DISCONTINUED | OUTPATIENT
Start: 2023-06-01 | End: 2023-06-01 | Stop reason: HOSPADM

## 2023-06-01 RX ORDER — MELOXICAM 7.5 MG/1
7.5 TABLET ORAL DAILY
Qty: 20 TABLET | Refills: 0 | Status: SHIPPED | OUTPATIENT
Start: 2023-06-01

## 2023-06-01 RX ORDER — CEFAZOLIN SODIUM 2 G/100ML
2 INJECTION, SOLUTION INTRAVENOUS ONCE
Status: COMPLETED | OUTPATIENT
Start: 2023-06-01 | End: 2023-06-01

## 2023-06-01 RX ORDER — ACETAMINOPHEN 500 MG
1000 TABLET ORAL ONCE
Status: COMPLETED | OUTPATIENT
Start: 2023-06-01 | End: 2023-06-01

## 2023-06-01 RX ORDER — MAGNESIUM HYDROXIDE 1200 MG/15ML
LIQUID ORAL AS NEEDED
Status: DISCONTINUED | OUTPATIENT
Start: 2023-06-01 | End: 2023-06-01 | Stop reason: HOSPADM

## 2023-06-01 RX ORDER — ONDANSETRON 2 MG/ML
4 INJECTION INTRAMUSCULAR; INTRAVENOUS ONCE AS NEEDED
Status: DISCONTINUED | OUTPATIENT
Start: 2023-06-01 | End: 2023-06-01 | Stop reason: HOSPADM

## 2023-06-01 RX ORDER — LIDOCAINE HYDROCHLORIDE 10 MG/ML
0.5 INJECTION, SOLUTION EPIDURAL; INFILTRATION; INTRACAUDAL; PERINEURAL ONCE AS NEEDED
Status: COMPLETED | OUTPATIENT
Start: 2023-06-01 | End: 2023-06-01

## 2023-06-01 RX ORDER — PROPOFOL 10 MG/ML
VIAL (ML) INTRAVENOUS AS NEEDED
Status: DISCONTINUED | OUTPATIENT
Start: 2023-06-01 | End: 2023-06-01 | Stop reason: SURG

## 2023-06-01 RX ORDER — OXYCODONE HYDROCHLORIDE 5 MG/1
5 TABLET ORAL EVERY 4 HOURS PRN
Qty: 10 TABLET | Refills: 0 | Status: SHIPPED | OUTPATIENT
Start: 2023-06-01

## 2023-06-01 RX ORDER — POLYETHYLENE GLYCOL 3350 17 G/17G
17 POWDER, FOR SOLUTION ORAL DAILY
Qty: 225 G | Refills: 0 | Status: SHIPPED | OUTPATIENT
Start: 2023-06-01

## 2023-06-01 RX ORDER — GABAPENTIN 300 MG/1
600 CAPSULE ORAL ONCE
Status: COMPLETED | OUTPATIENT
Start: 2023-06-01 | End: 2023-06-01

## 2023-06-01 RX ORDER — LIDOCAINE HYDROCHLORIDE 10 MG/ML
INJECTION, SOLUTION EPIDURAL; INFILTRATION; INTRACAUDAL; PERINEURAL AS NEEDED
Status: DISCONTINUED | OUTPATIENT
Start: 2023-06-01 | End: 2023-06-01 | Stop reason: SURG

## 2023-06-01 RX ORDER — FAMOTIDINE 20 MG/1
20 TABLET, FILM COATED ORAL ONCE
Status: COMPLETED | OUTPATIENT
Start: 2023-06-01 | End: 2023-06-01

## 2023-06-01 RX ADMIN — SUGAMMADEX 200 MG: 100 INJECTION, SOLUTION INTRAVENOUS at 08:39

## 2023-06-01 RX ADMIN — LIDOCAINE HYDROCHLORIDE 0.2 ML: 10 INJECTION, SOLUTION EPIDURAL; INFILTRATION; INTRACAUDAL; PERINEURAL at 06:25

## 2023-06-01 RX ADMIN — FENTANYL CITRATE 50 MCG: 50 INJECTION, SOLUTION INTRAMUSCULAR; INTRAVENOUS at 10:28

## 2023-06-01 RX ADMIN — DROPERIDOL 0.62 MG: 2.5 INJECTION, SOLUTION INTRAMUSCULAR; INTRAVENOUS at 10:46

## 2023-06-01 RX ADMIN — KETOROLAC TROMETHAMINE 30 MG: 30 INJECTION, SOLUTION INTRAMUSCULAR; INTRAVENOUS at 08:39

## 2023-06-01 RX ADMIN — LIDOCAINE HYDROCHLORIDE 50 MG: 10 INJECTION, SOLUTION EPIDURAL; INFILTRATION; INTRACAUDAL; PERINEURAL at 07:34

## 2023-06-01 RX ADMIN — HYDROMORPHONE HYDROCHLORIDE 0.5 MG: 1 INJECTION, SOLUTION INTRAMUSCULAR; INTRAVENOUS; SUBCUTANEOUS at 09:28

## 2023-06-01 RX ADMIN — FENTANYL CITRATE 50 MCG: 50 INJECTION, SOLUTION INTRAMUSCULAR; INTRAVENOUS at 09:19

## 2023-06-01 RX ADMIN — MIDAZOLAM HYDROCHLORIDE 2 MG: 1 INJECTION, SOLUTION INTRAMUSCULAR; INTRAVENOUS at 07:15

## 2023-06-01 RX ADMIN — FAMOTIDINE 20 MG: 20 TABLET ORAL at 06:39

## 2023-06-01 RX ADMIN — FENTANYL CITRATE 100 MCG: 50 INJECTION, SOLUTION INTRAMUSCULAR; INTRAVENOUS at 07:34

## 2023-06-01 RX ADMIN — OXYCODONE HYDROCHLORIDE 5 MG: 5 TABLET ORAL at 09:26

## 2023-06-01 RX ADMIN — GABAPENTIN 600 MG: 300 CAPSULE ORAL at 06:39

## 2023-06-01 RX ADMIN — ROCURONIUM BROMIDE 50 MG: 10 SOLUTION INTRAVENOUS at 07:34

## 2023-06-01 RX ADMIN — HYDROMORPHONE HYDROCHLORIDE 0.5 MG: 1 INJECTION, SOLUTION INTRAMUSCULAR; INTRAVENOUS; SUBCUTANEOUS at 09:17

## 2023-06-01 RX ADMIN — CEFAZOLIN SODIUM 2 G: 2 INJECTION, SOLUTION INTRAVENOUS at 07:28

## 2023-06-01 RX ADMIN — ONDANSETRON 4 MG: 2 INJECTION INTRAMUSCULAR; INTRAVENOUS at 08:39

## 2023-06-01 RX ADMIN — ACETAMINOPHEN 1000 MG: 500 TABLET ORAL at 06:39

## 2023-06-01 RX ADMIN — Medication 0.5 MG: at 09:17

## 2023-06-01 RX ADMIN — OXYCODONE 5 MG: 5 TABLET ORAL at 09:26

## 2023-06-01 RX ADMIN — DEXAMETHASONE SODIUM PHOSPHATE 8 MG: 4 INJECTION, SOLUTION INTRAMUSCULAR; INTRAVENOUS at 07:40

## 2023-06-01 RX ADMIN — Medication 0.5 MG: at 09:28

## 2023-06-01 RX ADMIN — PROPOFOL 200 MG: 10 INJECTION, EMULSION INTRAVENOUS at 07:34

## 2023-06-01 RX ADMIN — SODIUM CHLORIDE, POTASSIUM CHLORIDE, SODIUM LACTATE AND CALCIUM CHLORIDE 9 ML/HR: 600; 310; 30; 20 INJECTION, SOLUTION INTRAVENOUS at 06:25

## 2023-06-01 RX ADMIN — PROPOFOL 25 MCG/KG/MIN: 10 INJECTION, EMULSION INTRAVENOUS at 07:45

## 2023-06-01 RX ADMIN — ROCURONIUM BROMIDE 20 MG: 10 SOLUTION INTRAVENOUS at 08:03

## 2023-06-01 NOTE — OP NOTE
[facility] Hospitalist Progress Note     CC: Hospital Follow up    PCP: Jobie Rubinstein, MD       Assessment/Plan:     Principal Problem:    Acute kidney injury [facility])  Active Problems:    Carcinomatosis peritonei ([facility] [address]    Ms. Yen Wagner is an 78year old female with Hysterectomy Procedure Note      Pre-operative Diagnosis: Post endometrial ablation syndrome     Post-operative Diagnosis: Post endometrial ablation syndrome    Operation: Robotic Hysterectomy with bilateral salpingectomy    Surgeon: Mary Hopper MD     Assistants:  Assistant: Fran Recinos RNFA was responsible for performing the following activities: Retraction, Suction, Irrigation, Suturing, Closing and Placing Dressing and their skilled assistance was necessary for the success of this case.       Anesthesia: General endotracheal anesthesia    Findings: Normal pelvis    Estimated Blood Loss:  Minimal, less than 25mL    Specimens: Uterus with cervix and tubes    Complications:  None    Disposition: PACU - hemodynamically stable.      Procedure Details    The patient was seen in the Holding Room. The risks, benefits, complications, treatment options, and expected outcomes were discussed with the patient. The patient concurred with the proposed plan, giving informed consent. The patient was identified as Anita George and the procedure verified as robotic hysterectomy. A Time Out was held and the above information confirmed.    After induction of anesthesia, the patient was prepped and draped in the usual sterile manner. The uterus was sounded and fit with a Carli-Koh intrauterine manipulator.    The operators gloves were changed.  The supraumbilical area was injected with a dilute marcaine solution.  An 8mm supraumbilical incision was made. Intraperitoneal access was gained with the optical noncutting trocar under direct visualization. CO2 was used to insufflate the abdominopelvic cavity to a pressure of approximately 15 mm Hg. She was placed in maximum Trendelenburg. One additional 8mm port site was placed on the left and two additional 8mm ports sites were placed on the right.     The [employer]i system was docked and the procedure continued at the console. The anatomy was inspected and adhesions  outlined    Gabriela Polk MD  [facility] Hospitalist  Answering Service number: [phone]     Subjective:     S/p paracentesis yesterday with 3L removed, feels better today. +malignant cells on cytology    OBJECTIVE:    Blood pressure 113/63, pulse 77, tem were taken down with monopolar, sharp and blunt dissection. The ureters were identified bilaterally. The fallopian tube was dissected away from the ovary and the broad ligament with sharp and monopolar dissection.The left uteroovarian ligament was fulgurated and transected. The remainder of the broad ligament, down to and including the round ligament was fulguration and transected. The bladder was dissected across the midline with monopolar, sharp and blunt dissection. The procedure was repeated on the right side. The uterine vessels on the left and right were isolated, fulgurated and transected. A circumferential incision was made along the cervicovaginal junction and the uterus was removed from the vagina. The vaginal cuff was then closed with 2-0 vicryl and 0 vicryl. There was good hemostasis at low pressure.     The DaVinci system was undocked and the procedure was completed at the bedside. The trocars were removed. The skin incisions were closed with 3-0 plain and reinforced with skin glue. The counts were correct x 2. Patient was awakened and taken to recover in stable condition.       Mary Hopper MD  06/01/23  08:45 EDT   08/29/21  0634 08/30/21  0844   ALT 14 12* 11* 12*   AST 29 24 22 28   ALB 2.9* 2.8* 2.3* 2.7*         Imaging:  CT CHEST (CPT=71250)    Result Date: 8/31/2021  CONCLUSION:   0.4 cm left upper lobe pulmonary nodule.   Recommend follow-up chest CT as clinica

## 2023-06-01 NOTE — ANESTHESIA PREPROCEDURE EVALUATION
Anesthesia Evaluation     Patient summary reviewed and Nursing notes reviewed   no history of anesthetic complications:  NPO Solid Status: > 8 hours  NPO Liquid Status: > 2 hours           Airway   Mallampati: II  TM distance: >3 FB  Neck ROM: full  No difficulty expected  Dental - normal exam     Pulmonary - normal exam   (-) not a smoker  Cardiovascular - normal exam  Exercise tolerance: good (4-7 METS)    ECG reviewed    (+) hypertension, hyperlipidemia,       Neuro/Psych  (+) psychiatric history,    (-) seizures, CVA  GI/Hepatic/Renal/Endo    (+) obesity,  GERD well controlled,  renal disease stones, diabetes mellitus,     Musculoskeletal     Abdominal    Substance History   (+) alcohol use,   (-) drug use     OB/GYN          Other   arthritis,      ROS/Med Hx Other: hgb 14 k 4.9 hcg negative  hgb 14 k 4.9                 Anesthesia Plan    ASA 2     general     (Risks and benefits of general anesthesia discussed with patient (including MI, CVA, death, recall, aspiration, oropharyngeal/dental damage), questions answered, agreeable to proceed.    )  intravenous induction     Anesthetic plan, risks, benefits, and alternatives have been provided, discussed and informed consent has been obtained with: patient and spouse/significant other.    Use of blood products discussed with patient and spouse/significant other  Consented to blood products.   Plan discussed with CRNA.        CODE STATUS:

## 2023-06-01 NOTE — INTERVAL H&P NOTE
[facility] Pre-op    Full history and physical note from office is attached.    /89 (BP Location: Right arm, Patient Position: Lying)   Pulse 67   Temp 97.1 °F (36.2 °C) (Temporal)   Resp 18   LMP 05/26/2023 Comment: Irregular bleeding  SpO2 97%     Immunizations:  Influenza:  2022  Pneumococcal:  No  Tetanus:  UTD  Covid : x3    Review of Systems:  Constitutional-- No fever, chills or sweats. No fatigue.  CV-- No chest pain, palpitation or syncope  Resp-- No SOB, cough, hemoptysis  Skin--No rashes or lesions    LAB Results:  Lab Results   Component Value Date    WBC 9.87 05/30/2023    HGB 14.0 05/30/2023    HCT 42.2 05/30/2023    MCV 88.1 05/30/2023     05/30/2023    NEUTROABS 5.15 05/30/2023    GLUCOSE 101 (H) 04/05/2023    BUN 14 04/05/2023    CREATININE 0.96 04/05/2023    EGFRIFNONA 73 06/14/2021    EGFRIFAFRI 101 09/23/2020     04/05/2023    K 4.9 05/30/2023     04/05/2023    CO2 23.7 04/05/2023    MG 2.0 09/23/2020    CALCIUM 9.8 04/05/2023    ALBUMIN 4.3 04/05/2023    AST 17 04/05/2023    ALT 9 04/05/2023    BILITOT 0.4 04/05/2023    PTT 35.8 12/10/2020    INR 0.99 12/10/2020       Cancer Staging (if applicable)  Cancer Patient: __ yes __no __unknown__N/A; If yes, clinical stage T:__ N:__M:__, stage group or __N/A      Impression: Post endometrial ablation syndrome       Plan: TOTAL LAPAROSCOPIC HYSTERECTOMY WITH DAVINCI ROBOT      GINO Mojica   6/1/2023   06:43 EDT

## 2023-06-01 NOTE — ANESTHESIA PROCEDURE NOTES
Airway  Urgency: elective    Date/Time: 6/1/2023 7:35 AM  Airway not difficult    General Information and Staff    Patient location during procedure: OR  CRNA/CAA: Jasmyn Joseph CRNA    Indications and Patient Condition  Indications for airway management: airway protection    Preoxygenated: yes  MILS not maintained throughout  Mask difficulty assessment: 1 - vent by mask    Final Airway Details  Final airway type: endotracheal airway      Successful airway: ETT  Cuffed: yes   Successful intubation technique: direct laryngoscopy  Facilitating devices/methods: intubating stylet  Endotracheal tube insertion site: oral  Blade: [place]  Blade size: 3  ETT size (mm): 7.0  Cormack-Lehane Classification: grade I - full view of glottis  Placement verified by: chest auscultation and capnometry   Measured from: lips  ETT/EBT  to lips (cm): 21  Number of attempts at approach: 1  Assessment: lips, teeth, and gum same as pre-op and atraumatic intubation    Additional Comments  Negative epigastric sounds, Breath sound equal bilaterally with symmetric chest rise and fall

## 2023-06-01 NOTE — ANESTHESIA POSTPROCEDURE EVALUATION
Patient: Anita Olivas Ariel    Procedure Summary     Date: 06/01/23 Room / Location:  [facility]  [facility]  [facility]    Anesthesia Start: 0728 Anesthesia Stop: 0855    Procedure: TOTAL LAPAROSCOPIC HYSTERECTOMY BILATERAL SALPINGECTOMY WITH DAVINCI ROBOT (Abdomen) Diagnosis:     Surgeons: Mary Hopper MD Provider: Marilyn Joaquin DO    Anesthesia Type: general ASA Status: 2          Anesthesia Type: general    Vitals  Vitals Value Taken Time   /72 06/01/23 0855   Temp 97 °F (36.1 °C) 06/01/23 0855   Pulse 61 06/01/23 0856   Resp 13 06/01/23 0855   SpO2 100 % 06/01/23 0856   Vitals shown include unvalidated device data.        Post Anesthesia Care and Evaluation    Patient location during evaluation: PACU  Patient participation: waiting for patient participation  Level of consciousness: sleepy but conscious  Pain management: adequate    Airway patency: patent  Anesthetic complications: No anesthetic complications  PONV Status: none  Cardiovascular status: hemodynamically stable and acceptable  Respiratory status: nonlabored ventilation, acceptable, nasal cannula and oral airway  Hydration status: acceptable

## 2023-06-02 LAB
CYTO UR: NORMAL
LAB AP CASE REPORT: NORMAL
LAB AP CLINICAL INFORMATION: NORMAL
PATH REPORT.FINAL DX SPEC: NORMAL
PATH REPORT.GROSS SPEC: NORMAL

## 2023-07-24 ENCOUNTER — OFFICE VISIT (OUTPATIENT)
Dept: ENDOCRINOLOGY | Facility: CLINIC | Age: 38
End: 2023-07-24
Payer: COMMERCIAL

## 2023-07-24 VITALS
HEART RATE: 67 BPM | OXYGEN SATURATION: 98 % | BODY MASS INDEX: 31.18 KG/M2 | HEIGHT: 66 IN | DIASTOLIC BLOOD PRESSURE: 76 MMHG | SYSTOLIC BLOOD PRESSURE: 120 MMHG | WEIGHT: 194 LBS

## 2023-07-24 DIAGNOSIS — E78.2 MIXED HYPERLIPIDEMIA: ICD-10-CM

## 2023-07-24 DIAGNOSIS — Z79.4 TYPE 2 DIABETES MELLITUS WITHOUT COMPLICATION, WITH LONG-TERM CURRENT USE OF INSULIN: Primary | ICD-10-CM

## 2023-07-24 DIAGNOSIS — E11.9 TYPE 2 DIABETES MELLITUS WITHOUT COMPLICATION, WITH LONG-TERM CURRENT USE OF INSULIN: Primary | ICD-10-CM

## 2023-07-24 LAB
EXPIRATION DATE: NORMAL
GLUCOSE BLDC GLUCOMTR-MCNC: 129 MG/DL (ref 70–130)
Lab: NORMAL

## 2023-07-24 PROCEDURE — 99214 OFFICE O/P EST MOD 30 MIN: CPT | Performed by: PHYSICIAN ASSISTANT

## 2023-07-24 PROCEDURE — 82947 ASSAY GLUCOSE BLOOD QUANT: CPT | Performed by: PHYSICIAN ASSISTANT

## 2023-07-24 RX ORDER — SEMAGLUTIDE 2.68 MG/ML
2 INJECTION, SOLUTION SUBCUTANEOUS WEEKLY
Qty: 9 ML | Refills: 2 | Status: SHIPPED | OUTPATIENT
Start: 2023-07-24

## 2023-07-24 NOTE — PROGRESS NOTES
"     Office Note      Date: 2023  Patient Name: Anita George  MRN: [id number]  : 1985    Chief Complaint   Patient presents with    Diabetes     Type II       History of Present Illness:   Anita George is a 37 y.o. female who presents for follow-up for type 2 diabetes diagnosed in .  She had a hysterectomy  and was off her Ozempic and not active for over 6 weeks.  She reports she just started back her Ozempic and has been dialing up to [facility] or about 1 mg for the last 2 weeks.  She is due for her next shot later this week.  She reports she has been taking the Jardiance 25 mg regularly.  She was very frustrated with her weight gain today.  Her hemoglobin A1c was also up some at the end of May prior to her surgery at 6.4%.  She reports she has not been taking her atorvastatin regularly either.  She is up-to-date on her eye exam she will be due for her follow-up appointment this .  She denies any trouble with her feet today.  We did fasting labs in February.      Subjective     Review of Systems:   Review of Systems   Constitutional: Negative.    Cardiovascular: Negative.    Gastrointestinal: Negative.    Endocrine: Negative.    Neurological: Negative.      The following portions of the patient's history were reviewed and updated as appropriate: allergies, current medications, past family history, past medical history, past social history, past surgical history, and problem list.    Objective     Vitals:    23 0912   BP: 120/76   BP Location: Left arm   Patient Position: Sitting   Cuff Size: Adult   Pulse: 67   SpO2: 98%   Weight: 88 kg (194 lb)   Height: 167.6 cm (66\")     Body mass index is 31.31 kg/m².    Physical Exam  Vitals reviewed.   Constitutional:       General: She is not in acute distress.     Appearance: Normal appearance.   Cardiovascular:      Pulses:           Dorsalis pedis pulses are 2+ on the right side and 2+ on the left side.        Posterior " tibial pulses are 2+ on the right side and 2+ on the left side.   Musculoskeletal:      Right foot: No deformity.      Left foot: No deformity.   Feet:      Right foot:      Protective Sensation: 5 sites tested.  5 sites sensed.      Skin integrity: Skin integrity normal.      Toenail Condition: Right toenails are normal.      Left foot:      Protective Sensation: 5 sites tested.  5 sites sensed.      Skin integrity: Skin integrity normal.      Toenail Condition: Left toenails are normal.      Comments: Diabetic Foot Exam Performed and Monofilament Test Performed      Neurological:      Mental Status: She is alert.       HEMOGLOBIN A1C  Lab Results   Component Value Date    HGBA1C 6.40 (H) 05/30/2023       GLUCOSE  Glucose   Date Value Ref Range Status   07/24/2023 129 70 - 130 mg/dL Final       CMP  Lab Results   Component Value Date    GLUCOSE 101 (H) 04/05/2023    BUN 14 04/05/2023    CREATININE 0.96 04/05/2023    EGFRIFNONA 73 06/14/2021    EGFRIFAFRI 101 09/23/2020    BCR 14.6 04/05/2023    K 4.9 05/30/2023    CO2 23.7 04/05/2023    CALCIUM 9.8 04/05/2023    PROTENTOTREF 7.1 09/23/2020    LABIL2 1.6 09/23/2020    AST 17 04/05/2023    ALT 9 04/05/2023       LIPID PANEL  Lab Results   Component Value Date    CHOL 210 (H) 02/20/2023    TRIG 393 (H) 02/20/2023    HDL 38 (L) 02/20/2023     (H) 02/20/2023       URINE MICROALBUMIN/CREATININE RATIO  Microalbumin/Creatinine Ratio   Date Value Ref Range Status   07/14/2022 8.8 mg/g Final       TSH  Lab Results   Component Value Date    TSH 1.960 04/05/2023       Assessment / Plan      Assessment & Plan:  1. Type 2 diabetes mellitus without complication, with long-term current use of insulin  Her hemoglobin A1c was up some at the end of May at 6.4%.  This is still to goal.  For now she will continue the Ozempic 1 mg for 2 more weeks and then increase to the 2 mg dose weekly.  She will continue Jardiance 25 mg daily.  I refilled these prescriptions today.  Her blood  pressure is okay today.  She is due for urine microalbumin/creatinine ratio.  We will plan to do this next visit when we check her fasting labs.  She will remain off losartan for now.  Her weight is up 15 pounds since her appointment in February.  I encouraged healthy eating habits and physical activity as tolerated.  She will also resume her Ozempic.  - POC Glucose, Blood    2. Mixed hyperlipidemia  She will resume her atorvastatin 20 mg regularly.  We will plan to check fasting labs next visit for monitoring.  Patient to call as needed.      Return in about 3 months (around 10/24/2023) for  3-4 mos fasting next visit.     This note was dictated using Dragon voice recognition.    Elvira Daly PA-C  07/24/2023

## 2023-08-11 ENCOUNTER — HOSPITAL ENCOUNTER (OUTPATIENT)
Dept: MAMMOGRAPHY | Facility: HOSPITAL | Age: 38
Discharge: HOME | End: 2023-08-11
Admitting: NURSE PRACTITIONER
Payer: COMMERCIAL

## 2023-08-11 DIAGNOSIS — Z12.31 SCREENING MAMMOGRAM FOR BREAST CANCER: ICD-10-CM

## 2023-08-11 PROCEDURE — 77067 SCR MAMMO BI INCL CAD: CPT

## 2023-08-11 PROCEDURE — 77063 BREAST TOMOSYNTHESIS BI: CPT

## 2023-11-27 ENCOUNTER — OFFICE VISIT (OUTPATIENT)
Dept: ENDOCRINOLOGY | Facility: CLINIC | Age: 38
End: 2023-11-27
Payer: COMMERCIAL

## 2023-11-27 VITALS
HEART RATE: 62 BPM | DIASTOLIC BLOOD PRESSURE: 80 MMHG | HEIGHT: 66 IN | BODY MASS INDEX: 31.5 KG/M2 | WEIGHT: 196 LBS | SYSTOLIC BLOOD PRESSURE: 122 MMHG | OXYGEN SATURATION: 99 %

## 2023-11-27 DIAGNOSIS — E11.9 TYPE 2 DIABETES MELLITUS WITHOUT COMPLICATION, WITH LONG-TERM CURRENT USE OF INSULIN: Primary | ICD-10-CM

## 2023-11-27 DIAGNOSIS — E78.2 MIXED HYPERLIPIDEMIA: ICD-10-CM

## 2023-11-27 DIAGNOSIS — Z79.4 TYPE 2 DIABETES MELLITUS WITHOUT COMPLICATION, WITH LONG-TERM CURRENT USE OF INSULIN: Primary | ICD-10-CM

## 2023-11-27 LAB
ALBUMIN SERPL-MCNC: 4.8 G/DL (ref 3.5–5.2)
ALBUMIN/GLOB SERPL: 2 G/DL
ALP SERPL-CCNC: 77 U/L (ref 39–117)
ALT SERPL-CCNC: 21 U/L (ref 1–33)
AST SERPL-CCNC: 21 U/L (ref 1–32)
BILIRUB SERPL-MCNC: 0.2 MG/DL (ref 0–1.2)
BUN SERPL-MCNC: 12 MG/DL (ref 6–20)
BUN/CREAT SERPL: 15 (ref 7–25)
CALCIUM SERPL-MCNC: 9.9 MG/DL (ref 8.6–10.5)
CHLORIDE SERPL-SCNC: 104 MMOL/L (ref 98–107)
CHOLEST SERPL-MCNC: 165 MG/DL (ref 0–200)
CO2 SERPL-SCNC: 26.9 MMOL/L (ref 22–29)
CREAT SERPL-MCNC: 0.8 MG/DL (ref 0.57–1)
EGFRCR SERPLBLD CKD-EPI 2021: 95.7 ML/MIN/1.73
EXPIRATION DATE: ABNORMAL
EXPIRATION DATE: NORMAL
GLOBULIN SER CALC-MCNC: 2.4 GM/DL
GLUCOSE BLDC GLUCOMTR-MCNC: 101 MG/DL (ref 70–130)
GLUCOSE SERPL-MCNC: 91 MG/DL (ref 65–99)
HBA1C MFR BLD: 6.5 % (ref 4.5–5.7)
HDLC SERPL-MCNC: 41 MG/DL (ref 40–60)
LDLC SERPL CALC-MCNC: 103 MG/DL (ref 0–100)
Lab: ABNORMAL
Lab: NORMAL
POTASSIUM SERPL-SCNC: 4.3 MMOL/L (ref 3.5–5.2)
PROT SERPL-MCNC: 7.2 G/DL (ref 6–8.5)
SODIUM SERPL-SCNC: 142 MMOL/L (ref 136–145)
TRIGL SERPL-MCNC: 114 MG/DL (ref 0–150)
TSH SERPL DL<=0.005 MIU/L-ACNC: 1.47 UIU/ML (ref 0.27–4.2)
VLDLC SERPL CALC-MCNC: 21 MG/DL (ref 5–40)

## 2023-11-27 PROCEDURE — 83036 HEMOGLOBIN GLYCOSYLATED A1C: CPT | Performed by: PHYSICIAN ASSISTANT

## 2023-11-27 PROCEDURE — 99214 OFFICE O/P EST MOD 30 MIN: CPT | Performed by: PHYSICIAN ASSISTANT

## 2023-11-27 PROCEDURE — 36415 COLL VENOUS BLD VENIPUNCTURE: CPT | Performed by: PHYSICIAN ASSISTANT

## 2023-11-27 PROCEDURE — 82947 ASSAY GLUCOSE BLOOD QUANT: CPT | Performed by: PHYSICIAN ASSISTANT

## 2023-11-27 NOTE — PROGRESS NOTES
Office Note      Date: 2023  Patient Name: Anita George  MRN: [id number]  : 1985    Chief Complaint   Patient presents with    Diabetes     Type II       History of Present Illness:   Anita George is a 38 y.o. female who presents for follow-up for type 2 diabetes diagnosed in .  She remains on Ozempic 2 mg weekly, Jardiance 25 mg daily.  She reports she has been on the Ozempic 2 mg since about mid September.  She reports overall she feels well but is frustrated with her weight gain and has noted her blood sugars in the mornings have been a little bit higher recently.  She reports shortly after her appointment here she fractured her foot and was in a boot for 8 weeks.  She has been back to exercising the last 4 to 6 weeks and continues intermittent fasting.  She reports her insurance changed and her recent prescription of Ozempic is over $700.  She has a history of gastrointestinal intolerance to metformin.  She is up-to-date on her eye exam she has her appointment scheduled for next month during .  She denies any trouble with her feet today.  We did her foot exam at her appointment in July.  She is fasting today for labs.  She remains off losartan and is due for urine microalbumin/creatinine ratio today.  She remains on atorvastatin 20 mg daily.      Subjective     Review of Systems:   Review of Systems   Constitutional: Negative.    Cardiovascular: Negative.    Gastrointestinal: Negative.    Endocrine: Negative.    Neurological: Negative.        The following portions of the patient's history were reviewed and updated as appropriate: allergies, current medications, past family history, past medical history, past social history, past surgical history, and problem list.    Objective     Vitals:    23 0920   BP: 122/80   BP Location: Left arm   Patient Position: Sitting   Cuff Size: Adult   Pulse: 62   SpO2: 99%   Weight: 88.9 kg (196 lb)   Height: 167.6 cm  "(66\")     Body mass index is 31.64 kg/m².    Physical Exam  Vitals reviewed.   Constitutional:       General: She is not in acute distress.     Appearance: Normal appearance.   Neurological:      Mental Status: She is alert.         HEMOGLOBIN A1C  Lab Results   Component Value Date    HGBA1C 6.5 (A) 11/27/2023       GLUCOSE  Glucose   Date Value Ref Range Status   11/27/2023 101 70 - 130 mg/dL Final       CMP  Lab Results   Component Value Date    GLUCOSE 101 (H) 04/05/2023    BUN 14 04/05/2023    CREATININE 0.96 04/05/2023    EGFRIFNONA 73 06/14/2021    EGFRIFAFRI 101 09/23/2020    BCR 14.6 04/05/2023    K 4.9 05/30/2023    CO2 23.7 04/05/2023    CALCIUM 9.8 04/05/2023    PROTENTOTREF 7.1 09/23/2020    LABIL2 1.6 09/23/2020    AST 17 04/05/2023    ALT 9 04/05/2023       LIPID PANEL  Lab Results   Component Value Date    CHOL 210 (H) 02/20/2023    TRIG 393 (H) 02/20/2023    HDL 38 (L) 02/20/2023     (H) 02/20/2023       URINE MICROALBUMIN/CREATININE RATIO  Microalbumin/Creatinine Ratio   Date Value Ref Range Status   07/14/2022 8.8 mg/g Final       TSH  Lab Results   Component Value Date    TSH 1.960 04/05/2023       Assessment / Plan      Assessment & Plan:  1. Type 2 diabetes mellitus without complication, with long-term current use of insulin  Her hemoglobin A1c today is 6.5%.  This is up some as compared to July but is to goal.  For now we will continue Ozempic 2 mg weekly and Jardiance 25 mg daily.  She will check with her insurance to see if she has a high deductible drug plan or if we need to switch her to different medications due to coverage.  She has a sample of Ozempic 2 mg but otherwise has been unable to find this medication.  We discussed switching her to the 1 mg if needed until the 2 mg is available.  Her weight is up 2 pounds since her appointment in July.  I encouraged healthy eating habits and physical activity as tolerated.  Fasting labs pending today.  Will send note with results and " plan.  Her blood pressure is okay today.  - POC Glucose, Blood  - POC Glycosylated Hemoglobin (Hb A1C)  - Lipid Panel; Future  - Microalbumin / Creatinine Urine Ratio - Urine, Clean Catch; Future  - Comprehensive Metabolic Panel; Future  - TSH; Future  - TSH  - Microalbumin / Creatinine Urine Ratio - Urine, Clean Catch  - Comprehensive Metabolic Panel  - Lipid Panel    2. Mixed hyperlipidemia  Fasting labs pending today.  Will send note with results and plan.  For now she will continue atorvastatin 20 mg daily.  - Lipid Panel; Future  - Lipid Panel      Return in about 4 months (around 3/27/2024).     This note was dictated using Dragon voice recognition.    Elvira Daly PA-C  11/27/2023

## 2023-11-28 LAB
ALBUMIN/CREAT UR: 7 MG/G CREAT (ref 0–29)
CREAT UR-MCNC: 123.4 MG/DL
MICROALBUMIN UR-MCNC: 8.9 UG/ML

## 2024-01-08 ENCOUNTER — PRIOR AUTHORIZATION (OUTPATIENT)
Dept: ENDOCRINOLOGY | Facility: CLINIC | Age: 39
End: 2024-01-08
Payer: COMMERCIAL

## 2024-01-08 RX ORDER — SEMAGLUTIDE 2.68 MG/ML
INJECTION, SOLUTION SUBCUTANEOUS
Qty: 9 ML | Refills: 0 | Status: SHIPPED | OUTPATIENT
Start: 2024-01-08

## 2024-01-08 NOTE — TELEPHONE ENCOUNTER
Rx Refill Note  Requested Prescriptions     Pending Prescriptions Disp Refills    Ozempic, 2 MG/DOSE, 8 MG/3ML solution pen-injector [Pharmacy Med Name: OZEMPIC 2MG PER DOSE (8MG/3ML) PFP] 9 mL 2     Sig: INJECT 2 MG UNDER THE SKIN INTO APPROPRIATE AREA AS DIRECTED ONCE WEEKLY.      Last office visit with prescribing clinician: 11/27/2023   Last telemedicine visit with prescribing clinician: Visit date not found   Next office visit with prescribing clinician: Visit date not found                         Would you like a call back once the refill request has been completed: [] Yes [] No    If the office needs to give you a call back, can they leave a voicemail: [] Yes [] No    Saundra Juarez MA  01/08/24, 09:14 EST

## 2024-01-09 NOTE — TELEPHONE ENCOUNTER
Anita George (Key: [id number])  Rx #: [id number]  Ozempic (2 MG/DOSE) 8MG/3ML pen-injectors  Form  [employer] Electronic PA Form (2017 [employer])  Created  24 hours ago  Sent to Plan  24 hours ago  Plan Response  24 hours ago  Submit Clinical Questions  24 hours ago  Determination  Favorable  23 hours ago  Message from Plan  PA Case: [id number], Status: Approved, Coverage Starts on: 1/8/2024 12:00:00 AM, Coverage Ends on: 1/7/2025

## 2024-05-17 ENCOUNTER — OFFICE VISIT (OUTPATIENT)
Dept: FAMILY MEDICINE CLINIC | Facility: CLINIC | Age: 39
End: 2024-05-17
Payer: COMMERCIAL

## 2024-05-17 VITALS
SYSTOLIC BLOOD PRESSURE: 118 MMHG | TEMPERATURE: 98.2 F | RESPIRATION RATE: 14 BRPM | HEIGHT: 66 IN | OXYGEN SATURATION: 98 % | HEART RATE: 63 BPM | DIASTOLIC BLOOD PRESSURE: 68 MMHG | WEIGHT: 191 LBS | BODY MASS INDEX: 30.7 KG/M2

## 2024-05-17 DIAGNOSIS — R31.9 HEMATURIA, UNSPECIFIED TYPE: ICD-10-CM

## 2024-05-17 DIAGNOSIS — R10.9 FLANK PAIN: ICD-10-CM

## 2024-05-17 DIAGNOSIS — Z87.442 HISTORY OF KIDNEY STONES: ICD-10-CM

## 2024-05-17 DIAGNOSIS — R10.9 FLANK PAIN: Primary | ICD-10-CM

## 2024-05-17 LAB
BILIRUB BLD-MCNC: NEGATIVE MG/DL
CLARITY, POC: CLEAR
COLOR UR: YELLOW
EXPIRATION DATE: ABNORMAL
GLUCOSE UR STRIP-MCNC: NEGATIVE MG/DL
KETONES UR QL: NEGATIVE
LEUKOCYTE EST, POC: NEGATIVE
Lab: ABNORMAL
NITRITE UR-MCNC: NEGATIVE MG/ML
PH UR: 6 [PH] (ref 5–8)
PROT UR STRIP-MCNC: NEGATIVE MG/DL
RBC # UR STRIP: ABNORMAL /UL
SP GR UR: 1.02 (ref 1–1.03)
UROBILINOGEN UR QL: ABNORMAL

## 2024-05-17 RX ORDER — TAMSULOSIN HYDROCHLORIDE 0.4 MG/1
1 CAPSULE ORAL DAILY
Qty: 30 CAPSULE | Refills: 0 | Status: SHIPPED | OUTPATIENT
Start: 2024-05-17

## 2024-05-17 RX ORDER — SULFAMETHOXAZOLE AND TRIMETHOPRIM 800; 160 MG/1; MG/1
1 TABLET ORAL 2 TIMES DAILY
Qty: 20 TABLET | Refills: 0 | Status: SHIPPED | OUTPATIENT
Start: 2024-05-17 | End: 2024-05-27

## 2024-05-17 RX ORDER — TAMSULOSIN HYDROCHLORIDE 0.4 MG/1
1 CAPSULE ORAL DAILY
Qty: 90 CAPSULE | OUTPATIENT
Start: 2024-05-17

## 2024-05-17 NOTE — PROGRESS NOTES
Date: 2024   Patient Name: Anita George  : 1985   MRN: [id number]     Chief Complaint:    Chief Complaint   Patient presents with    Flank Pain       History of Present Illness: Anita George is a 38 y.o. female who is here today for Left flank pain, Frequent urination, Nausea that has been present for the last 2-3 days. At the beginning of the week she had stomach virus symptoms, nausea, vomiting and diarrhea. She has had kidney stones in the past. Last one was . She has followed Dr carrillo in the past with urology.     Flank Pain  Associated symptoms include dysuria.            Review of Systems:   Review of Systems   Genitourinary:  Positive for dysuria, flank pain and frequency.       Past Medical History:   Past Medical History:   Diagnosis Date    Arthritis     Diabetes mellitus     Elevated liver enzymes     GERD (gastroesophageal reflux disease)     History of kidney disease     kidney stones    History of squamous cell carcinoma of skin     lip    Hypertension     history of, no longer on antihypertensives since weight loss    Hypertensive disorder     Kidney stones     Leukocytosis     Long term (current) use of insulin     history of    Mixed hyperlipidemia     Obesity     body mass index 30+-obesity    Type 2 diabetes mellitus        Past Surgical History:   Past Surgical History:   Procedure Laterality Date    BONE MARROW BIOPSY      CHOLECYSTECTOMY      COLONOSCOPY      ENDOMETRIAL ABLATION      KIDNEY STONE SURGERY      with stent placement    KIDNEY STONE SURGERY  may 21st 2021    basket, blast and stents    KNEE SURGERY Right     LAPAROSCOPIC TUBAL LIGATION      MOHS SURGERY      lower lip    SHOULDER SURGERY Left     scoped and cleaned out    TOTAL LAPAROSCOPIC HYSTERECTOMY N/A 2023    Procedure: TOTAL LAPAROSCOPIC HYSTERECTOMY BILATERAL SALPINGECTOMY WITH DAVINCI ROBOT;  Surgeon: Mary Hopper MD;  Location: [facility];  Service: Robotics - DaVinci;   Laterality: N/A;    TUBAL ABDOMINAL LIGATION Bilateral     WISDOM TOOTH EXTRACTION         Family History:   Family History   Problem Relation Age of Onset    Breast cancer Mother 52    Pancreatic cancer Mother     Diabetes Mother     Hyperlipidemia Mother     Hypertension Mother     Cancer Mother         - pancreatic and breast cancer    Diabetes Father     Hyperlipidemia Father     Hypertension Father     Ovarian cancer Neg Hx        Social History:   Social History     Socioeconomic History    Marital status:    Tobacco Use    Smoking status: Never    Smokeless tobacco: Never   Vaping Use    Vaping status: Never Used   Substance and Sexual Activity    Alcohol use: Yes     Comment: Social    Drug use: Never    Sexual activity: Yes     Partners: Male     Birth control/protection: Surgical, Hysterectomy       Medications:     Current Outpatient Medications:     albuterol sulfate  (90 Base) MCG/ACT inhaler, Inhale 2 puffs As Needed for Shortness of Air or Wheezing., Disp: , Rfl:     atorvastatin (LIPITOR) 20 MG tablet, TAKE 1 TABLET BY MOUTH DAILY, Disp: 90 tablet, Rfl: 1    empagliflozin (Jardiance) 25 MG tablet tablet, Take 1 tablet by mouth Daily., Disp: 90 tablet, Rfl: 2    glucose blood test strip, Use to check blood sugar 2-3 times daily., Disp: 200 each, Rfl: 12    Semaglutide, 2 MG/DOSE, (Ozempic, 2 MG/DOSE,) 8 MG/3ML solution pen-injector, INJECT 2 MG UNDER THE SKIN INTO APPROPRIATE AREA AS DIRECTED ONCE WEEKLY., Disp: 9 mL, Rfl: 0    LOSARTAN POTASSIUM PO, Take  by mouth. Currently being HELD by her endocrinologist (Patient not taking: Reported on 2024), Disp: , Rfl:     sulfamethoxazole-trimethoprim (Bactrim DS) 800-160 MG per tablet, Take 1 tablet by mouth 2 (Two) Times a Day for 10 days., Disp: 20 tablet, Rfl: 0    tamsulosin (FLOMAX) 0.4 MG capsule 24 hr capsule, Take 1 capsule by mouth Daily., Disp: 30 capsule, Rfl: 0    Allergies:   No Known Allergies      Physical  "Exam:  Vital Signs:   Vitals:    05/17/24 1030   BP: 118/68   Pulse: 63   Resp: 14   Temp: 98.2 °F (36.8 °C)   SpO2: 98%   Weight: 86.6 kg (191 lb)   Height: 167.6 cm (66\")     Body mass index is 30.83 kg/m².     Physical Exam  Vitals and nursing note reviewed.   Constitutional:       Appearance: Normal appearance.   HENT:      Head: Normocephalic and atraumatic.   Cardiovascular:      Rate and Rhythm: Normal rate and regular rhythm.   Pulmonary:      Effort: Pulmonary effort is normal. No respiratory distress.      Breath sounds: Normal breath sounds.   Abdominal:      General: Bowel sounds are normal.      Palpations: Abdomen is soft.      Tenderness: There is abdominal tenderness in the suprapubic area. There is left CVA tenderness. There is no right CVA tenderness.   Musculoskeletal:         General: Normal range of motion.   Skin:     General: Skin is warm.   Neurological:      General: No focal deficit present.      Mental Status: She is alert and oriented to person, place, and time.   Psychiatric:         Mood and Affect: Mood normal.           Assessment/Plan:   Diagnoses and all orders for this visit:    1. Flank pain (Primary)  -     POCT urinalysis dipstick, automated  -     Cancel: CT Abdomen Pelvis Without Contrast; Future  -     sulfamethoxazole-trimethoprim (Bactrim DS) 800-160 MG per tablet; Take 1 tablet by mouth 2 (Two) Times a Day for 10 days.  Dispense: 20 tablet; Refill: 0  -     tamsulosin (FLOMAX) 0.4 MG capsule 24 hr capsule; Take 1 capsule by mouth Daily.  Dispense: 30 capsule; Refill: 0  -     CT Abdomen Pelvis Without Contrast; Future    2. History of kidney stones  -     Cancel: CT Abdomen Pelvis Without Contrast; Future  -     CT Abdomen Pelvis Without Contrast; Future    3. Hematuria, unspecified type  -     Cancel: CT Abdomen Pelvis Without Contrast; Future  -     sulfamethoxazole-trimethoprim (Bactrim DS) 800-160 MG per tablet; Take 1 tablet by mouth 2 (Two) Times a Day for 10 days.  " Dispense: 20 tablet; Refill: 0  -     tamsulosin (FLOMAX) 0.4 MG capsule 24 hr capsule; Take 1 capsule by mouth Daily.  Dispense: 30 capsule; Refill: 0  -     CT Abdomen Pelvis Without Contrast; Future  -     Urine Culture - Urine, Urine, Clean Catch; Future  -     Urine Culture - Urine, Urine, Clean Catch       UTI education  Take medications as prescribed within chart today  UA positive in clinic  Urine culture ordered  Monitor for worsening symptoms  Increase water intake  Wear cotton underwear  Go to the ER with temp of 103 or greater, severe abdominal pain, back pain, nausea and diarrhea.    CT abd/pel ordered monitor for worsening symptoms, go to the ER with severe symptoms, 10/10 pain    Follow Up:   Return if symptoms worsen or fail to improve.    Donna Raymundo. GINO   [facility]

## 2024-05-19 LAB
BACTERIA UR CULT: NO GROWTH
BACTERIA UR CULT: NORMAL

## 2024-05-23 ENCOUNTER — PATIENT ROUNDING (BHMG ONLY) (OUTPATIENT)
Dept: FAMILY MEDICINE CLINIC | Facility: CLINIC | Age: 39
End: 2024-05-23
Payer: COMMERCIAL

## 2024-05-23 NOTE — PROGRESS NOTES
A My-Chart message has been sent to the patient for PATIENT ROUNDING with [facility].

## 2024-06-17 NOTE — TELEPHONE ENCOUNTER
Caller: Anita George Deisy    Relationship: Self    Best call back number:     [phone]       Requested Prescriptions:   Requested Prescriptions     Pending Prescriptions Disp Refills    Semaglutide, 2 MG/DOSE, (Ozempic, 2 MG/DOSE,) 8 MG/3ML solution pen-injector 9 mL 0        Pharmacy where request should be sent:    [id number] - [place], [place] - [address]  AT [facility] [address] - [phone]  - [phone] FX   [address] [place] [address]   Phone: [phone] Fax: [phone]   Hours: Not open 24 hours     Last office visit with prescribing clinician: 11/27/2023   Last telemedicine visit with prescribing clinician: Visit date not found   Next office visit with prescribing clinician: 10/17/2024     Additional details provided by patient: PT IS CONCERNED THAT THE MEDICATION IS NOT WORKING PT IS WANTING TO GET IN SOONER TO SEE . PT HAS AN APT SCHEDULED FOR OCTOBER. PLEASE ADVISE.     Does the patient have less than a 3 day supply:  [x] Yes  [] No    Would you like a call back once the refill request has been completed: [x] Yes [] No    If the office needs to give you a call back, can they leave a voicemail: [x] Yes [] No    Carlos Blake   06/17/24 15:19 EDT

## 2024-06-18 RX ORDER — SEMAGLUTIDE 2.68 MG/ML
2 INJECTION, SOLUTION SUBCUTANEOUS WEEKLY
Qty: 9 ML | Refills: 0 | Status: SHIPPED | OUTPATIENT
Start: 2024-06-18

## 2024-06-18 NOTE — TELEPHONE ENCOUNTER
Rx Refill Note  Requested Prescriptions     Pending Prescriptions Disp Refills    Semaglutide, 2 MG/DOSE, (Ozempic, 2 MG/DOSE,) 8 MG/3ML solution pen-injector 9 mL 0     Sig: Inject 2 mg under the skin into the appropriate area as directed 1 (One) Time Per Week.      Last office visit with prescribing clinician: 11/27/2023     Next office visit with prescribing clinician: 10/17/2024                           Batool Mayes MA  06/18/24, 07:43 EDT

## 2024-06-23 DIAGNOSIS — R10.9 FLANK PAIN: ICD-10-CM

## 2024-06-23 DIAGNOSIS — R31.9 HEMATURIA, UNSPECIFIED TYPE: ICD-10-CM

## 2024-06-24 RX ORDER — TAMSULOSIN HYDROCHLORIDE 0.4 MG/1
1 CAPSULE ORAL DAILY
Qty: 30 CAPSULE | Refills: 0 | Status: SHIPPED | OUTPATIENT
Start: 2024-06-24 | End: 2024-06-25

## 2024-06-25 ENCOUNTER — OFFICE VISIT (OUTPATIENT)
Dept: ENDOCRINOLOGY | Facility: CLINIC | Age: 39
End: 2024-06-25
Payer: COMMERCIAL

## 2024-06-25 VITALS
BODY MASS INDEX: 30.37 KG/M2 | SYSTOLIC BLOOD PRESSURE: 112 MMHG | WEIGHT: 189 LBS | HEART RATE: 75 BPM | DIASTOLIC BLOOD PRESSURE: 68 MMHG | HEIGHT: 66 IN | OXYGEN SATURATION: 99 %

## 2024-06-25 DIAGNOSIS — E11.9 TYPE 2 DIABETES MELLITUS WITHOUT COMPLICATION, WITH LONG-TERM CURRENT USE OF INSULIN: Primary | ICD-10-CM

## 2024-06-25 DIAGNOSIS — Z79.4 TYPE 2 DIABETES MELLITUS WITHOUT COMPLICATION, WITH LONG-TERM CURRENT USE OF INSULIN: Primary | ICD-10-CM

## 2024-06-25 DIAGNOSIS — E78.2 MIXED HYPERLIPIDEMIA: ICD-10-CM

## 2024-06-25 PROBLEM — I10 ESSENTIAL HYPERTENSION: Status: RESOLVED | Noted: 2020-09-23 | Resolved: 2024-06-25

## 2024-06-25 LAB
ALBUMIN SERPL-MCNC: 4.5 G/DL (ref 3.5–5.2)
ALBUMIN/GLOB SERPL: 1.7 G/DL
ALP SERPL-CCNC: 63 U/L (ref 39–117)
ALT SERPL W P-5'-P-CCNC: 17 U/L (ref 1–33)
ANION GAP SERPL CALCULATED.3IONS-SCNC: 11 MMOL/L (ref 5–15)
AST SERPL-CCNC: 14 U/L (ref 1–32)
BILIRUB SERPL-MCNC: 0.4 MG/DL (ref 0–1.2)
BUN SERPL-MCNC: 13 MG/DL (ref 6–20)
BUN/CREAT SERPL: 16 (ref 7–25)
CALCIUM SPEC-SCNC: 9.5 MG/DL (ref 8.6–10.5)
CHLORIDE SERPL-SCNC: 104 MMOL/L (ref 98–107)
CHOLEST SERPL-MCNC: 165 MG/DL (ref 0–200)
CO2 SERPL-SCNC: 27 MMOL/L (ref 22–29)
CREAT SERPL-MCNC: 0.81 MG/DL (ref 0.57–1)
EGFRCR SERPLBLD CKD-EPI 2021: 94.2 ML/MIN/1.73
EXPIRATION DATE: NORMAL
EXPIRATION DATE: NORMAL
GLOBULIN UR ELPH-MCNC: 2.7 GM/DL
GLUCOSE BLDC GLUCOMTR-MCNC: 79 MG/DL (ref 70–130)
GLUCOSE SERPL-MCNC: 74 MG/DL (ref 65–99)
HBA1C MFR BLD: 5.7 % (ref 4.5–5.7)
HDLC SERPL-MCNC: 44 MG/DL (ref 40–60)
LDLC SERPL CALC-MCNC: 90 MG/DL (ref 0–100)
LDLC/HDLC SERPL: 1.93 {RATIO}
Lab: NORMAL
Lab: NORMAL
POTASSIUM SERPL-SCNC: 4 MMOL/L (ref 3.5–5.2)
PROT SERPL-MCNC: 7.2 G/DL (ref 6–8.5)
SODIUM SERPL-SCNC: 142 MMOL/L (ref 136–145)
TRIGL SERPL-MCNC: 181 MG/DL (ref 0–150)
TSH SERPL DL<=0.05 MIU/L-ACNC: 1.27 UIU/ML (ref 0.27–4.2)
VLDLC SERPL-MCNC: 31 MG/DL (ref 5–40)

## 2024-06-25 PROCEDURE — 80053 COMPREHEN METABOLIC PANEL: CPT | Performed by: PHYSICIAN ASSISTANT

## 2024-06-25 PROCEDURE — 99214 OFFICE O/P EST MOD 30 MIN: CPT | Performed by: PHYSICIAN ASSISTANT

## 2024-06-25 PROCEDURE — 80061 LIPID PANEL: CPT | Performed by: PHYSICIAN ASSISTANT

## 2024-06-25 PROCEDURE — 84443 ASSAY THYROID STIM HORMONE: CPT | Performed by: PHYSICIAN ASSISTANT

## 2024-06-25 PROCEDURE — 83036 HEMOGLOBIN GLYCOSYLATED A1C: CPT | Performed by: PHYSICIAN ASSISTANT

## 2024-06-25 PROCEDURE — 82947 ASSAY GLUCOSE BLOOD QUANT: CPT | Performed by: PHYSICIAN ASSISTANT

## 2024-06-25 RX ORDER — SEMAGLUTIDE 2.68 MG/ML
2 INJECTION, SOLUTION SUBCUTANEOUS WEEKLY
Qty: 9 ML | Refills: 2 | Status: SHIPPED | OUTPATIENT
Start: 2024-06-25

## 2024-06-25 NOTE — PROGRESS NOTES
"     Office Note      Date: 2024  Patient Name: Anita George  MRN: [id number]  : 1985    Chief Complaint   Patient presents with    Diabetes     Type 2 diabetes mellitus without complication, with long-term current use of insulin       History of Present Illness:   Anita George is a 38 y.o. female who presents for follow-up for type 2 diabetes diagnosed in .  She remains on Ozempic 2 mg weekly and Jardiance 25 mg daily.  She has a history of gastrointestinal side effects with metformin.  She reports she has been exercising more but is frustrated she has not been able to lose more weight.  She is thrilled her A1c has improved.  She is up-to-date on her eye exam she had her appointment in December.  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  She is taking her atorvastatin 20 mg regularly.  She is fasting today if we need labs.  She denies any trouble with her feet today.  She had surgery for kidney stone prior to Memorial Day weekend and is doing well.      Subjective     Review of Systems:   Review of Systems   Constitutional: Negative.    Cardiovascular: Negative.    Gastrointestinal: Negative.    Endocrine: Negative.    Neurological: Negative.        The following portions of the patient's history were reviewed and updated as appropriate: allergies, current medications, past family history, past medical history, past social history, past surgical history, and problem list.    Objective     Vitals:    24 1309   BP: 112/68   BP Location: Left arm   Patient Position: Sitting   Cuff Size: Adult   Pulse: 75   SpO2: 99%   Weight: 85.7 kg (189 lb)   Height: 167.6 cm (66\")     Body mass index is 30.51 kg/m².    Physical Exam  Vitals reviewed.   Constitutional:       General: She is not in acute distress.     Appearance: Normal appearance.   Cardiovascular:      Pulses:           Dorsalis pedis pulses are 2+ on the right " side and 2+ on the left side.        Posterior tibial pulses are 2+ on the right side and 2+ on the left side.   Musculoskeletal:      Right foot: No deformity.      Left foot: No deformity.   Feet:      Right foot:      Protective Sensation: 5 sites tested.  5 sites sensed.      Skin integrity: Skin integrity normal.      Toenail Condition: Right toenails are normal.      Left foot:      Protective Sensation: 5 sites tested.  5 sites sensed.      Skin integrity: Skin integrity normal.      Toenail Condition: Left toenails are normal.      Comments: Diabetic Foot Exam Performed and Monofilament Test Performed      Neurological:      Mental Status: She is alert.         HEMOGLOBIN A1C  Lab Results   Component Value Date    HGBA1C 5.7 06/25/2024       GLUCOSE  Glucose   Date Value Ref Range Status   06/25/2024 79 70 - 130 mg/dL Final       CMP  Lab Results   Component Value Date    GLUCOSE 91 11/27/2023    BUN 12 11/27/2023    CREATININE 0.80 11/27/2023    EGFRIFNONA 73 06/14/2021    EGFRIFAFRI 101 09/23/2020    BCR 15.0 11/27/2023    K 4.3 11/27/2023    CO2 26.9 11/27/2023    CALCIUM 9.9 11/27/2023    PROTENTOTREF 7.2 11/27/2023    LABIL2 2.0 11/27/2023    AST 21 11/27/2023    ALT 21 11/27/2023       LIPID PANEL  Lab Results   Component Value Date    CHOL 210 (H) 02/20/2023    CHLPL 165 11/27/2023    TRIG 114 11/27/2023    HDL 41 11/27/2023     (H) 11/27/2023       URINE MICROALBUMIN/CREATININE RATIO  Microalbumin/Creatinine Ratio   Date Value Ref Range Status   11/27/2023 7 0 - 29 mg/g creat Final     Comment:                            Normal:                0 -  29                         Moderately increased: 30 - 300                         Severely increased:       >300     07/14/2022 8.8 mg/g Final       TSH  Lab Results   Component Value Date    TSH 1.470 11/27/2023       Assessment / Plan      Assessment & Plan:  1. Type 2 diabetes mellitus without complication, with long-term current use of  insulin  Hemoglobin A1c is excellent today at 5.7%.  For now we will continue Ozempic 2 mg weekly as well as Jardiance 25 mg daily.  Refilled these prescriptions today.  We discussed considering switching to a different GLP-1 injectable but due to supply issues currently I recommended she continue the Ozempic 2 mg weekly for now.  Her weight is down 7 pounds since her appointment in November.  I congratulated her on her efforts and encouraged continued healthy eating habits and physical activity as tolerated.  Fasting labs pending today.  Will send note with results and plan.  For now she will continue atorvastatin 20 mg daily.  - POC Glycosylated Hemoglobin (Hb A1C)  - POC Glucose, Blood  - Comprehensive Metabolic Panel; Future  - TSH; Future  - Lipid Panel; Future  - Comprehensive Metabolic Panel  - TSH  - Lipid Panel    2. Mixed hyperlipidemia  Fasting labs pending today.  Will send note with results and plan.  Her last appointment in November her LDL cholesterol was mildly elevated.  We will recheck this today she will continue atorvastatin 20 mg daily for now.  - Lipid Panel; Future  - Lipid Panel      Return for keep October appt.     This note was dictated using Dragon voice recognition.    Electronically signed by: MARC Easton  06/25/2024

## 2024-07-05 ENCOUNTER — APPOINTMENT (OUTPATIENT)
Dept: GENERAL RADIOLOGY | Facility: HOSPITAL | Age: 39
End: 2024-07-05
Payer: OTHER MISCELLANEOUS

## 2024-07-05 ENCOUNTER — HOSPITAL ENCOUNTER (EMERGENCY)
Facility: HOSPITAL | Age: 39
Discharge: HOME | End: 2024-07-05
Attending: EMERGENCY MEDICINE
Payer: OTHER MISCELLANEOUS

## 2024-07-05 ENCOUNTER — APPOINTMENT (OUTPATIENT)
Dept: CT IMAGING | Facility: HOSPITAL | Age: 39
End: 2024-07-05
Payer: OTHER MISCELLANEOUS

## 2024-07-05 VITALS
BODY MASS INDEX: 30.53 KG/M2 | TEMPERATURE: 98 F | DIASTOLIC BLOOD PRESSURE: 89 MMHG | RESPIRATION RATE: 20 BRPM | WEIGHT: 190 LBS | HEART RATE: 79 BPM | SYSTOLIC BLOOD PRESSURE: 128 MMHG | OXYGEN SATURATION: 98 % | HEIGHT: 66 IN
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
## 2024-07-05 DIAGNOSIS — M79.18 MUSCULOSKELETAL PAIN: ICD-10-CM

## 2024-07-05 PROCEDURE — 73030 X-RAY EXAM OF SHOULDER: CPT | Performed by: RADIOLOGY

## 2024-07-05 PROCEDURE — 73090 X-RAY EXAM OF FOREARM: CPT | Performed by: RADIOLOGY

## 2024-07-05 PROCEDURE — 73060 X-RAY EXAM OF HUMERUS: CPT | Performed by: RADIOLOGY

## 2024-07-05 PROCEDURE — 72125 CT NECK SPINE W/O DYE: CPT

## 2024-07-05 PROCEDURE — 73090 X-RAY EXAM OF FOREARM: CPT

## 2024-07-05 PROCEDURE — 99284 EMERGENCY DEPT VISIT MOD MDM: CPT

## 2024-07-05 PROCEDURE — 70450 CT HEAD/BRAIN W/O DYE: CPT | Performed by: RADIOLOGY

## 2024-07-05 PROCEDURE — 73030 X-RAY EXAM OF SHOULDER: CPT

## 2024-07-05 PROCEDURE — 70450 CT HEAD/BRAIN W/O DYE: CPT

## 2024-07-05 PROCEDURE — 73562 X-RAY EXAM OF KNEE 3: CPT | Performed by: RADIOLOGY

## 2024-07-05 PROCEDURE — 72125 CT NECK SPINE W/O DYE: CPT | Performed by: RADIOLOGY

## 2024-07-05 PROCEDURE — 73562 X-RAY EXAM OF KNEE 3: CPT

## 2024-07-05 PROCEDURE — 73060 X-RAY EXAM OF HUMERUS: CPT

## 2024-07-05 RX ORDER — DICLOFENAC SODIUM 75 MG/1
75 TABLET, DELAYED RELEASE ORAL 2 TIMES DAILY
Qty: 14 TABLET | Refills: 0 | Status: SHIPPED | OUTPATIENT
Start: 2024-07-05 | End: 2024-07-05

## 2024-07-05 RX ORDER — ACETAMINOPHEN 500 MG
1000 TABLET ORAL ONCE
Status: COMPLETED | OUTPATIENT
Start: 2024-07-05 | End: 2024-07-05

## 2024-07-05 RX ORDER — METHYLPREDNISOLONE 4 MG/1
TABLET ORAL
Qty: 21 TABLET | Refills: 0 | Status: SHIPPED | OUTPATIENT
Start: 2024-07-05 | End: 2024-07-05

## 2024-07-05 RX ORDER — DICLOFENAC SODIUM 75 MG/1
75 TABLET, DELAYED RELEASE ORAL 2 TIMES DAILY
Qty: 14 TABLET | Refills: 0 | Status: SHIPPED | OUTPATIENT
Start: 2024-07-05 | End: 2024-10-17

## 2024-07-05 RX ORDER — ORPHENADRINE CITRATE 100 MG/1
100 TABLET ORAL 2 TIMES DAILY PRN
Qty: 14 TABLET | Refills: 0 | Status: SHIPPED | OUTPATIENT
Start: 2024-07-05 | End: 2024-07-05

## 2024-07-05 RX ORDER — ORPHENADRINE CITRATE 100 MG/1
100 TABLET ORAL 2 TIMES DAILY PRN
Qty: 14 TABLET | Refills: 0 | Status: SHIPPED | OUTPATIENT
Start: 2024-07-05 | End: 2024-07-16

## 2024-07-05 RX ORDER — METHYLPREDNISOLONE 4 MG/1
TABLET ORAL
Qty: 21 TABLET | Refills: 0 | Status: SHIPPED | OUTPATIENT
Start: 2024-07-05 | End: 2024-07-16

## 2024-07-05 RX ADMIN — ACETAMINOPHEN 1000 MG: 500 TABLET ORAL at 15:38
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
## 2024-07-16 ENCOUNTER — OFFICE VISIT (OUTPATIENT)
Dept: FAMILY MEDICINE CLINIC | Facility: CLINIC | Age: 39
End: 2024-07-16
Payer: OTHER MISCELLANEOUS

## 2024-07-16 VITALS
OXYGEN SATURATION: 99 % | HEART RATE: 74 BPM | BODY MASS INDEX: 30.08 KG/M2 | HEIGHT: 66 IN | DIASTOLIC BLOOD PRESSURE: 74 MMHG | RESPIRATION RATE: 14 BRPM | TEMPERATURE: 97.8 F | WEIGHT: 187.2 LBS | SYSTOLIC BLOOD PRESSURE: 120 MMHG
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
## 2024-07-16 DIAGNOSIS — G44.311 INTRACTABLE ACUTE POST-TRAUMATIC HEADACHE: ICD-10-CM

## 2024-07-16 DIAGNOSIS — M62.838 MUSCLE SPASM: ICD-10-CM

## 2024-07-16 PROCEDURE — 99214 OFFICE O/P EST MOD 30 MIN: CPT | Performed by: NURSE PRACTITIONER

## 2024-07-16 RX ORDER — CYCLOBENZAPRINE HCL 10 MG
10 TABLET ORAL 2 TIMES DAILY PRN
Qty: 20 TABLET | Refills: 0 | Status: SHIPPED | OUTPATIENT
Start: 2024-07-16 | End: 2024-07-26
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
## 2024-10-17 ENCOUNTER — OFFICE VISIT (OUTPATIENT)
Dept: ENDOCRINOLOGY | Facility: CLINIC | Age: 39
End: 2024-10-17
Payer: COMMERCIAL

## 2024-10-17 VITALS
SYSTOLIC BLOOD PRESSURE: 122 MMHG | HEIGHT: 66 IN | WEIGHT: 178 LBS | BODY MASS INDEX: 28.61 KG/M2 | OXYGEN SATURATION: 98 % | DIASTOLIC BLOOD PRESSURE: 84 MMHG | HEART RATE: 70 BPM

## 2024-10-17 DIAGNOSIS — Z79.4 TYPE 2 DIABETES MELLITUS WITHOUT COMPLICATION, WITH LONG-TERM CURRENT USE OF INSULIN: Primary | ICD-10-CM

## 2024-10-17 DIAGNOSIS — E11.9 TYPE 2 DIABETES MELLITUS WITHOUT COMPLICATION, WITH LONG-TERM CURRENT USE OF INSULIN: Primary | ICD-10-CM

## 2024-10-17 LAB
EXPIRATION DATE: ABNORMAL
EXPIRATION DATE: NORMAL
GLUCOSE BLDC GLUCOMTR-MCNC: 108 MG/DL (ref 70–130)
HBA1C MFR BLD: 5.8 % (ref 4.5–5.7)
Lab: ABNORMAL
Lab: NORMAL
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
## 2024-11-15 ENCOUNTER — OFFICE VISIT (OUTPATIENT)
Dept: FAMILY MEDICINE CLINIC | Facility: CLINIC | Age: 39
End: 2024-11-15
Payer: COMMERCIAL

## 2024-11-15 ENCOUNTER — HOSPITAL ENCOUNTER (OUTPATIENT)
Dept: ULTRASOUND IMAGING | Facility: HOSPITAL | Age: 39
Discharge: HOME | End: 2024-11-15
Admitting: NURSE PRACTITIONER
Payer: COMMERCIAL

## 2024-11-15 VITALS
OXYGEN SATURATION: 95 % | DIASTOLIC BLOOD PRESSURE: 90 MMHG | BODY MASS INDEX: 29.38 KG/M2 | WEIGHT: 182.8 LBS | HEIGHT: 66 IN | SYSTOLIC BLOOD PRESSURE: 120 MMHG | RESPIRATION RATE: 14 BRPM | TEMPERATURE: 97.5 F | HEART RATE: 64 BPM

## 2024-11-15 DIAGNOSIS — E11.9 TYPE 2 DIABETES MELLITUS WITHOUT COMPLICATION, WITH LONG-TERM CURRENT USE OF INSULIN: ICD-10-CM

## 2024-11-15 DIAGNOSIS — R19.09 RIGHT GROIN MASS: Primary | ICD-10-CM

## 2024-11-15 DIAGNOSIS — K40.90 UNILATERAL INGUINAL HERNIA WITHOUT OBSTRUCTION OR GANGRENE, RECURRENCE NOT SPECIFIED: Primary | ICD-10-CM

## 2024-11-15 DIAGNOSIS — Z79.4 TYPE 2 DIABETES MELLITUS WITHOUT COMPLICATION, WITH LONG-TERM CURRENT USE OF INSULIN: ICD-10-CM

## 2024-11-15 DIAGNOSIS — Z12.31 SCREENING MAMMOGRAM FOR BREAST CANCER: ICD-10-CM

## 2024-11-15 PROCEDURE — 76882 US LMTD JT/FCL EVL NVASC XTR: CPT

## 2024-11-15 NOTE — PROGRESS NOTES
Date: 11/15/2024   Patient Name: Anita George  : 1985   MRN: [id number]     Chief Complaint:    Chief Complaint   Patient presents with    Abdominal Pain     Pain & bulge close to right groin area. Unsure of any injury.        History of Present Illness: Anita George is a 39 y.o. female who is here today for Right groin with bulge and pain is present   3 weeks ago it presented, it is getting better, and more painful  She does do some heavy lifting   No new changes, no new trauma     Due for mammogram       Review of Systems:   Review of Systems   Musculoskeletal:         Right groin pain.       Past Medical History:   Past Medical History:   Diagnosis Date    Arthritis     Cancer     Squamous cell carcinoma    Diabetes mellitus     Elevated liver enzymes     GERD (gastroesophageal reflux disease)     History of kidney disease     kidney stones    History of squamous cell carcinoma of skin     lip    Hypertension     history of, no longer on antihypertensives since weight loss    Hypertensive disorder     Kidney stones 2024    Leukocytosis     Long term (current) use of insulin     history of    Mixed hyperlipidemia     Obesity     body mass index 30+-obesity    Type 2 diabetes mellitus        Past Surgical History:   Past Surgical History:   Procedure Laterality Date    BONE MARROW BIOPSY      CHOLECYSTECTOMY      COLONOSCOPY      ENDOMETRIAL ABLATION      KIDNEY STONE SURGERY      with stent placement    KIDNEY STONE SURGERY  may 21st 2021    basket, blast and stents    KNEE SURGERY Right     LAPAROSCOPIC TUBAL LIGATION      MOHS SURGERY      lower lip    SHOULDER SURGERY Left     scoped and cleaned out    SUBTOTAL HYSTERECTOMY      TOTAL LAPAROSCOPIC HYSTERECTOMY N/A 2023    Procedure: TOTAL LAPAROSCOPIC HYSTERECTOMY BILATERAL SALPINGECTOMY WITH DAVINCI ROBOT;  Surgeon: Mary Hopper MD;  [facility]: [facility];  Service: Robotics - DaVinci;  Laterality: N/A;    TUBAL  "ABDOMINAL LIGATION Bilateral     WISDOM TOOTH EXTRACTION         Family History:   Family History   Problem Relation Age of Onset    Breast cancer Mother 52    Pancreatic cancer Mother     Diabetes Mother     Hyperlipidemia Mother     Hypertension Mother     Cancer Mother         - pancreatic and breast cancer    Diabetes Father     Hyperlipidemia Father     Hypertension Father     Ovarian cancer Neg Hx        Social History:   Social History     Socioeconomic History    Marital status:    Tobacco Use    Smoking status: Never    Smokeless tobacco: Never   Vaping Use    Vaping status: Never Used   Substance and Sexual Activity    Alcohol use: Yes     Comment: Social    Drug use: Never    Sexual activity: Yes     Partners: Male     Birth control/protection: Tubal ligation, Hysterectomy, Surgical     Comment: Partial Hys       Medications:     Current Outpatient Medications:     albuterol sulfate  (90 Base) MCG/ACT inhaler, Inhale 2 puffs As Needed for Shortness of Air or Wheezing., Disp: , Rfl:     atorvastatin (LIPITOR) 20 MG tablet, TAKE 1 TABLET BY MOUTH DAILY, Disp: 90 tablet, Rfl: 1    empagliflozin (Jardiance) 25 MG tablet tablet, Take 1 tablet by mouth Daily., Disp: 90 tablet, Rfl: 2    glucose blood test strip, Use to check blood sugar 2-3 times daily., Disp: 200 each, Rfl: 12    Semaglutide, 2 MG/DOSE, (Ozempic, 2 MG/DOSE,) 8 MG/3ML solution pen-injector, Inject 2 mg under the skin into the appropriate area as directed 1 (One) Time Per Week., Disp: 9 mL, Rfl: 2    Allergies:   No Known Allergies      Physical Exam:  Vital Signs:   Vitals:    11/15/24 1039   BP: 120/90   Pulse: 64   Resp: 14   Temp: 97.5 °F (36.4 °C)   SpO2: 95%   Weight: 82.9 kg (182 lb 12.8 oz)   Height: 167.6 cm (66\")     Body mass index is 29.5 kg/m².     Physical Exam  Vitals and nursing note reviewed.   Constitutional:       Appearance: Normal appearance.   HENT:      Head: Normocephalic and atraumatic. "   Cardiovascular:      Rate and Rhythm: Normal rate and regular rhythm.   Pulmonary:      Effort: Pulmonary effort is normal.      Breath sounds: Normal breath sounds.   Abdominal:      General: Bowel sounds are normal.      Hernia: Hernia is present in the right inguinal area.   Skin:     General: Skin is warm.   Neurological:      General: No focal deficit present.      Mental Status: She is alert and oriented to person, place, and time.   Psychiatric:         Mood and Affect: Mood normal.           Assessment/Plan:   Diagnoses and all orders for this visit:    1. Right groin mass (Primary)  Comments:  ordering US of right groin, determining findings will order referral to general surgery, avoid heavy lifting.  Orders:  -     Cancel: US Nonvascular Extremity Limited; Future  -     US Nonvascular Extremity Limited    2. Screening mammogram for breast cancer  -     Mammo Screening Digital Tomosynthesis Bilateral With CAD; Future    3. Type 2 diabetes mellitus without complication, with long-term current use of insulin  Assessment & Plan:  Diabetes is stable.   Continue current treatment regimen.  Diabetes will be reassessed in 6 months    Orders:  -     Comprehensive Metabolic Panel  -     CBC Auto Differential  -     T4, Free  -     TSH           Follow Up:   Return in about 6 months (around 5/15/2025), or if symptoms worsen or fail to improve.    Donna Raymundo. GINO   [facility]   Answers submitted by the patient for this visit:  Other (Submitted on 11/15/2024)  Please describe your symptoms.: Pelvic inflamation, possible hernia  Have you had these symptoms before?: No  How long have you been having these symptoms?: Greater than 2 weeks  Primary Reason for Visit (Submitted on 11/15/2024)  What is the primary reason for your visit?: Problem Not Listed

## 2024-11-15 NOTE — PROGRESS NOTES
RELAYED RESULTS WITH PATIENT WITH UNDERSTANDING. 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

## 2024-11-16 LAB
ALBUMIN SERPL-MCNC: 4.6 G/DL (ref 3.9–4.9)
ALP SERPL-CCNC: 80 IU/L (ref 44–121)
ALT SERPL-CCNC: 21 IU/L (ref 0–32)
AST SERPL-CCNC: 19 IU/L (ref 0–40)
BASOPHILS # BLD AUTO: 0.1 X10E3/UL (ref 0–0.2)
BASOPHILS NFR BLD AUTO: 1 %
BILIRUB SERPL-MCNC: 0.3 MG/DL (ref 0–1.2)
BUN SERPL-MCNC: 19 MG/DL (ref 6–24)
BUN/CREAT SERPL: 22 (ref 9–23)
CALCIUM SERPL-MCNC: 9.7 MG/DL (ref 8.7–10.2)
CHLORIDE SERPL-SCNC: 102 MMOL/L (ref 96–106)
CO2 SERPL-SCNC: 28 MMOL/L (ref 20–29)
CREAT SERPL-MCNC: 0.87 MG/DL (ref 0.57–1)
EGFRCR SERPLBLD CKD-EPI 2021: 86 ML/MIN/1.73
EOSINOPHIL # BLD AUTO: 0.1 X10E3/UL (ref 0–0.4)
EOSINOPHIL NFR BLD AUTO: 1 %
ERYTHROCYTE [DISTWIDTH] IN BLOOD BY AUTOMATED COUNT: 12.5 % (ref 11.7–15.4)
GLOBULIN SER CALC-MCNC: 2.5 G/DL (ref 1.5–4.5)
GLUCOSE SERPL-MCNC: 97 MG/DL (ref 70–99)
HCT VFR BLD AUTO: 45.6 % (ref 34–46.6)
HGB BLD-MCNC: 14.9 G/DL (ref 11.1–15.9)
IMM GRANULOCYTES # BLD AUTO: 0 X10E3/UL (ref 0–0.1)
IMM GRANULOCYTES NFR BLD AUTO: 0 %
LYMPHOCYTES # BLD AUTO: 4 X10E3/UL (ref 0.7–3.1)
LYMPHOCYTES NFR BLD AUTO: 37 %
MCH RBC QN AUTO: 29.5 PG (ref 26.6–33)
MCHC RBC AUTO-ENTMCNC: 32.7 G/DL (ref 31.5–35.7)
MCV RBC AUTO: 90 FL (ref 79–97)
MONOCYTES # BLD AUTO: 0.6 X10E3/UL (ref 0.1–0.9)
MONOCYTES NFR BLD AUTO: 5 %
NEUTROPHILS # BLD AUTO: 6 X10E3/UL (ref 1.4–7)
NEUTROPHILS NFR BLD AUTO: 56 %
PLATELET # BLD AUTO: 476 X10E3/UL (ref 150–450)
POTASSIUM SERPL-SCNC: 4.4 MMOL/L (ref 3.5–5.2)
PROT SERPL-MCNC: 7.1 G/DL (ref 6–8.5)
RBC # BLD AUTO: 5.05 X10E6/UL (ref 3.77–5.28)
SODIUM SERPL-SCNC: 141 MMOL/L (ref 134–144)
T4 FREE SERPL-MCNC: 1.25 NG/DL (ref 0.82–1.77)
TSH SERPL DL<=0.005 MIU/L-ACNC: 1.35 UIU/ML (ref 0.45–4.5)
WBC # BLD AUTO: 10.7 X10E3/UL (ref 3.4–10.8)

## 2024-11-18 ENCOUNTER — TELEPHONE (OUTPATIENT)
Dept: FAMILY MEDICINE CLINIC | Facility: CLINIC | Age: 39
End: 2024-11-18
Payer: COMMERCIAL

## 2024-11-18 NOTE — TELEPHONE ENCOUNTER
Patient cannot get in with [facility] until 12/16/24, she is willing to go to  or [facility] if she can get in earlier.

## 2024-11-18 NOTE — TELEPHONE ENCOUNTER
PATIENT CALLED TO INFORM MARLENE THAT SHE IS HAD BLOOD IN HER STOOLS AND ALSO L UPPER ABD PAIN. SHE WANTS TO KNOW IF THIS IS NORMAL?

## 2025-01-08 ENCOUNTER — PRIOR AUTHORIZATION (OUTPATIENT)
Dept: ENDOCRINOLOGY | Facility: CLINIC | Age: 40
End: 2025-01-08
Payer: COMMERCIAL

## 2025-01-09 NOTE — TELEPHONE ENCOUNTER
ALFA BURNS (Key: [id number])  Ozempic (2 MG/DOSE) 8MG/3ML pen-injectors  Form  [employer] Electronic PA Form (2017 NCPDP)  Created  1 day ago  Sent to Plan  1 day ago  Plan Response  23 hours ago  Submit Clinical Questions  23 hours ago  Determination  Favorable  23 hours ago  Message from Plan  Your PA request has been approved. Additional information will be provided in the approval communication. (Message 1140). Authorization Expiration Date: January 8, 2026.

## 2025-03-21 ENCOUNTER — PRIOR AUTHORIZATION (OUTPATIENT)
Dept: ENDOCRINOLOGY | Facility: CLINIC | Age: 40
End: 2025-03-21
Payer: COMMERCIAL

## 2025-03-21 NOTE — TELEPHONE ENCOUNTER
ALFA BURNS (Key: [id number])  PA Case ID #: [id number]  Rx #: [id number]  Need Help? Call us at [phone]  Outcome  Approved today by Mariam [facility] 2017  Your PA request has been approved. Additional information will be provided in the approval communication. (Message 1147)  Effective Date: 3/21/2025  Authorization Expiration Date: 3/21/2026  Drug  Jardiance 25MG tablets  ePA cloud logo  Form  [employer] Electronic PA Form (2017 NCPDP)

## 2025-04-28 ENCOUNTER — TRANSCRIBE ORDERS (OUTPATIENT)
Dept: ADMINISTRATIVE | Facility: HOSPITAL | Age: 40
End: 2025-04-28
Payer: COMMERCIAL

## 2025-04-28 DIAGNOSIS — Z12.31 VISIT FOR SCREENING MAMMOGRAM: Primary | ICD-10-CM

## 2025-06-12 ENCOUNTER — TELEPHONE (OUTPATIENT)
Dept: ENDOCRINOLOGY | Facility: CLINIC | Age: 40
End: 2025-06-12
Payer: COMMERCIAL

## 2025-06-12 RX ORDER — BLOOD SUGAR DIAGNOSTIC
STRIP MISCELLANEOUS
Qty: 100 EACH | Refills: 3 | Status: SHIPPED | OUTPATIENT
Start: 2025-06-12

## 2025-06-12 RX ORDER — BLOOD-GLUCOSE METER
1 EACH MISCELLANEOUS DAILY
Qty: 1 KIT | Refills: 0 | Status: SHIPPED | OUTPATIENT
Start: 2025-06-12

## 2025-06-12 RX ORDER — LANCETS
EACH MISCELLANEOUS
Qty: 100 EACH | Refills: 3 | Status: SHIPPED | OUTPATIENT
Start: 2025-06-12

## 2025-06-12 RX ORDER — SEMAGLUTIDE 0.68 MG/ML
0.5 INJECTION, SOLUTION SUBCUTANEOUS WEEKLY
Qty: 3 ML | Refills: 3 | Status: SHIPPED | OUTPATIENT
Start: 2025-06-12

## 2025-06-12 NOTE — TELEPHONE ENCOUNTER
Pt is reporting fasting -170, has been monitoring for ~ 1 week since surgery. Had not been monitoring previously. Pt has not been on Ozempic consistently d/t since Nov due to procedures - has not taken it in a few months. Pt has been taking Jardiance - will need refill.   Pt need guidance on if she should resume Ozempic, at what dose?  Or alternate medication. Pt will come in sooner for appt if there is availability.   Pt also needs new meter and testing supplies.

## 2025-06-12 NOTE — TELEPHONE ENCOUNTER
I sent prescriptions for testing supplies to her pharmacy.  I think I is okay to start the Ozempic again as long as she does not have any more procedure where she has been advised to hold this medication.  Since she has been off this a while I would start at a lower dose.    I sent a prescription for Ozempic 0.5 mg weekly as well-- I would suggest she start the 0.25 mg weekly dose for 2 weeks and then go to the 0.5 mg dose and we can increase this as she tolerates it.  Thanks RT

## 2025-07-09 ENCOUNTER — HOSPITAL ENCOUNTER (OUTPATIENT)
Dept: MAMMOGRAPHY | Facility: HOSPITAL | Age: 40
Discharge: HOME | End: 2025-07-09
Admitting: NURSE PRACTITIONER
Payer: COMMERCIAL

## 2025-07-09 DIAGNOSIS — Z12.31 VISIT FOR SCREENING MAMMOGRAM: ICD-10-CM

## 2025-07-09 PROCEDURE — 77063 BREAST TOMOSYNTHESIS BI: CPT

## 2025-07-09 PROCEDURE — 77067 SCR MAMMO BI INCL CAD: CPT

## 2025-07-20 ENCOUNTER — PATIENT MESSAGE (OUTPATIENT)
Dept: ENDOCRINOLOGY | Facility: CLINIC | Age: 40
End: 2025-07-20
Payer: COMMERCIAL

## 2025-07-28 ENCOUNTER — OFFICE VISIT (OUTPATIENT)
Dept: ENDOCRINOLOGY | Facility: CLINIC | Age: 40
End: 2025-07-28
Payer: COMMERCIAL

## 2025-07-28 VITALS
OXYGEN SATURATION: 98 % | HEIGHT: 66 IN | WEIGHT: 192 LBS | SYSTOLIC BLOOD PRESSURE: 114 MMHG | HEART RATE: 65 BPM | BODY MASS INDEX: 30.86 KG/M2 | DIASTOLIC BLOOD PRESSURE: 80 MMHG

## 2025-07-28 DIAGNOSIS — Z79.4 TYPE 2 DIABETES MELLITUS WITHOUT COMPLICATION, WITH LONG-TERM CURRENT USE OF INSULIN: Primary | ICD-10-CM

## 2025-07-28 DIAGNOSIS — E78.2 MIXED HYPERLIPIDEMIA: ICD-10-CM

## 2025-07-28 DIAGNOSIS — E11.9 TYPE 2 DIABETES MELLITUS WITHOUT COMPLICATION, WITH LONG-TERM CURRENT USE OF INSULIN: Primary | ICD-10-CM

## 2025-07-28 LAB
EXPIRATION DATE: ABNORMAL
EXPIRATION DATE: ABNORMAL
GLUCOSE BLDC GLUCOMTR-MCNC: 155 MG/DL (ref 70–130)
HBA1C MFR BLD: 6.3 % (ref 4.5–5.7)
Lab: ABNORMAL
Lab: ABNORMAL

## 2025-07-28 RX ORDER — SEMAGLUTIDE 2.68 MG/ML
2 INJECTION, SOLUTION SUBCUTANEOUS WEEKLY
Qty: 3 ML | Refills: 5 | Status: SHIPPED | OUTPATIENT
Start: 2025-07-28

## 2025-07-28 NOTE — PROGRESS NOTES
"     Office Note      Date: 2025  Patient Name: Anita George  MRN: [id number]  : 1985    Chief Complaint   Patient presents with    Diabetes     Type 2 diabetes mellitus without complication, with long-term current use of insulin    Hyperlipidemia       History of Present Illness:   Anita George is a 39 y.o. female who presents for follow-up for type 2 diabetes diagnosed in .  It has been about 9 months since her last appointment.  She has had a lot going on since last visit.  She had laparoscopic inguinal hernia repair surgery in December.  Left shoulder surgery for rotator cuff repair in March and open inguinal hernia repair in .  She was off her Ozempic for several months but is back on this now.  She is up to the 1 mg dose and has been on this for the last 2 weeks.  She has tolerated this with no major issues.  She remains on Jardiance 25 mg daily.  She has a history of gastrointestinal issues with metformin.  She is up-to-date on her eye exam she goes annually in December.  She denies any trouble with her feet today.  She is willing to have fasting labs completed in the next few weeks for monitoring.  She reports she has not been taking her atorvastatin regularly.  She reports with all the surgeries she forgot.      Subjective     Review of Systems:   Review of Systems   Constitutional: Negative.    Cardiovascular: Negative.    Gastrointestinal: Negative.    Endocrine: Negative.    Neurological: Negative.        The following portions of the patient's history were reviewed and updated as appropriate: allergies, current medications, past family history, past medical history, past social history, past surgical history, and problem list.    Objective     Vitals:    25 1443   BP: 114/80   BP Location: Right arm   Patient Position: Sitting   Cuff Size: Adult   Pulse: 65   SpO2: 98%   Weight: 87.1 kg (192 lb)   Height: 167.6 cm (66\")     Body mass index is 30.99 " kg/m².    Physical Exam  Vitals reviewed.   Constitutional:       General: She is not in acute distress.     Appearance: Normal appearance.   Cardiovascular:      Pulses:           Dorsalis pedis pulses are 2+ on the right side and 2+ on the left side.        Posterior tibial pulses are 2+ on the right side and 2+ on the left side.   Musculoskeletal:      Right foot: No deformity.      Left foot: No deformity.   Feet:      Right foot:      Protective Sensation: 5 sites tested.  5 sites sensed.      Skin integrity: Skin integrity normal.      Toenail Condition: Right toenails are normal.      Left foot:      Protective Sensation: 5 sites tested.  5 sites sensed.      Skin integrity: Skin integrity normal.      Toenail Condition: Left toenails are normal.      Comments: Diabetic Foot Exam Performed and Monofilament Test Performed      Neurological:      Mental Status: She is alert.         HEMOGLOBIN A1C  Lab Results   Component Value Date    HGBA1C 6.3 (A) 07/28/2025       GLUCOSE  Glucose   Date Value Ref Range Status   07/28/2025 155 (A) 70 - 130 mg/dL Final       CMP  Lab Results   Component Value Date    GLUCOSE 97 11/15/2024    BUN 19 11/15/2024    CREATININE 0.87 11/15/2024    EGFRIFNONA 73 06/14/2021    EGFRIFAFRI 101 09/23/2020    BCR 22 11/15/2024    K 4.4 11/15/2024    CO2 28 11/15/2024    CALCIUM 9.7 11/15/2024    AST 19 11/15/2024    ALT 21 11/15/2024       LIPID PANEL  Lab Results   Component Value Date    CHOL 165 06/25/2024    CHLPL 165 11/27/2023    TRIG 181 (H) 06/25/2024    HDL 44 06/25/2024    LDL 90 06/25/2024       URINE MICROALBUMIN/CREATININE RATIO  Microalbumin/Creatinine Ratio   Date Value Ref Range Status   11/27/2023 7 0 - 29 mg/g creat Final     Comment:                            Normal:                0 -  29                         Moderately increased: 30 - 300                         Severely increased:       >300     07/14/2022 8.8 mg/g Final       TSH  Lab Results   Component Value  Date    TSH 1.350 11/15/2024       Assessment / Plan      Assessment & Plan:  1. Type 2 diabetes mellitus without complication, with long-term current use of insulin  Her hemoglobin A1c is up as compared to last October at 6.3%.  This is still to goal.  She reports her weight loss has tapered the last week or 2.  We will increase her Ozempic back to the 2 mg weekly dose and she will continue Jardiance 25 mg daily.  I sent new prescriptions to her pharmacy.  Her weight is up 14 pounds since her appointment last October.  I encouraged healthy eating habits and physical activity as tolerated.  Her blood pressure is okay today.  Fasting labs entered.  She will have these completed sometime in the next several weeks for monitoring.  Will send note with results and plan.  - POC Glucose, Blood  - POC Glycosylated Hemoglobin (Hb A1C)  - TSH; Future  - Comprehensive Metabolic Panel; Future  - Lipid Panel; Future  - Microalbumin / Creatinine Urine Ratio - Urine, Clean Catch; Future    2. Mixed hyperlipidemia  Fasting labs pending.  She will have these completed sometime in the next few weeks for monitoring.  For now she will remain off her atorvastatin and we will discuss if this is needed once her labs have been reviewed.  Patient to call as needed.  - Lipid Panel; Future      Return in about 4 months (around 11/28/2025) for Recheck.     This note was dictated using Dragon voice recognition.    Electronically signed by: MARC Easton  07/28/2025

## 2025-08-05 ENCOUNTER — LAB (OUTPATIENT)
Facility: HOSPITAL | Age: 40
End: 2025-08-05
Payer: COMMERCIAL

## 2025-08-05 DIAGNOSIS — E11.9 TYPE 2 DIABETES MELLITUS WITHOUT COMPLICATION, WITH LONG-TERM CURRENT USE OF INSULIN: ICD-10-CM

## 2025-08-05 DIAGNOSIS — E78.2 MIXED HYPERLIPIDEMIA: ICD-10-CM

## 2025-08-05 DIAGNOSIS — E11.9 TYPE 2 DIABETES MELLITUS WITHOUT COMPLICATION, WITH LONG-TERM CURRENT USE OF INSULIN: Primary | ICD-10-CM

## 2025-08-05 DIAGNOSIS — Z79.4 TYPE 2 DIABETES MELLITUS WITHOUT COMPLICATION, WITH LONG-TERM CURRENT USE OF INSULIN: ICD-10-CM

## 2025-08-05 DIAGNOSIS — Z79.4 TYPE 2 DIABETES MELLITUS WITHOUT COMPLICATION, WITH LONG-TERM CURRENT USE OF INSULIN: Primary | ICD-10-CM

## 2025-08-05 LAB
ALBUMIN SERPL-MCNC: 4.4 G/DL (ref 3.5–5.2)
ALBUMIN/GLOB SERPL: 1.4 G/DL
ALP SERPL-CCNC: 65 U/L (ref 39–117)
ALT SERPL W P-5'-P-CCNC: 15 U/L (ref 1–33)
ANION GAP SERPL CALCULATED.3IONS-SCNC: 10.2 MMOL/L (ref 5–15)
AST SERPL-CCNC: 19 U/L (ref 1–32)
BILIRUB SERPL-MCNC: 0.3 MG/DL (ref 0–1.2)
BUN SERPL-MCNC: 16 MG/DL (ref 6–20)
BUN/CREAT SERPL: 17.6 (ref 7–25)
CALCIUM SPEC-SCNC: 9.7 MG/DL (ref 8.6–10.5)
CHLORIDE SERPL-SCNC: 106 MMOL/L (ref 98–107)
CHOLEST SERPL-MCNC: 182 MG/DL (ref 0–200)
CO2 SERPL-SCNC: 23.8 MMOL/L (ref 22–29)
CREAT SERPL-MCNC: 0.91 MG/DL (ref 0.57–1)
DEPRECATED RDW RBC AUTO: 39.8 FL (ref 37–54)
EGFRCR SERPLBLD CKD-EPI 2021: 80.9 ML/MIN/1.73
ERYTHROCYTE [DISTWIDTH] IN BLOOD BY AUTOMATED COUNT: 12.4 % (ref 12.3–15.4)
GLOBULIN UR ELPH-MCNC: 3.2 GM/DL
GLUCOSE SERPL-MCNC: 107 MG/DL (ref 65–99)
HCT VFR BLD AUTO: 44.6 % (ref 34–46.6)
HDLC SERPL-MCNC: 40 MG/DL (ref 40–60)
HGB BLD-MCNC: 15.1 G/DL (ref 12–15.9)
LDLC SERPL CALC-MCNC: 113 MG/DL (ref 0–100)
LDLC/HDLC SERPL: 2.74 {RATIO}
MCH RBC QN AUTO: 30 PG (ref 26.6–33)
MCHC RBC AUTO-ENTMCNC: 33.9 G/DL (ref 31.5–35.7)
MCV RBC AUTO: 88.7 FL (ref 79–97)
PLATELET # BLD AUTO: 412 10*3/MM3 (ref 140–450)
PMV BLD AUTO: 9.6 FL (ref 6–12)
POTASSIUM SERPL-SCNC: 4.8 MMOL/L (ref 3.5–5.2)
PROT SERPL-MCNC: 7.6 G/DL (ref 6–8.5)
RBC # BLD AUTO: 5.03 10*6/MM3 (ref 3.77–5.28)
SODIUM SERPL-SCNC: 140 MMOL/L (ref 136–145)
TRIGL SERPL-MCNC: 163 MG/DL (ref 0–150)
VLDLC SERPL-MCNC: 29 MG/DL (ref 5–40)
WBC NRBC COR # BLD AUTO: 11.05 10*3/MM3 (ref 3.4–10.8)

## 2025-08-05 PROCEDURE — 80050 GENERAL HEALTH PANEL: CPT

## 2025-08-05 PROCEDURE — 82043 UR ALBUMIN QUANTITATIVE: CPT

## 2025-08-05 PROCEDURE — 82570 ASSAY OF URINE CREATININE: CPT

## 2025-08-05 PROCEDURE — 80061 LIPID PANEL: CPT

## 2025-08-06 ENCOUNTER — RESULTS FOLLOW-UP (OUTPATIENT)
Dept: ENDOCRINOLOGY | Facility: CLINIC | Age: 40
End: 2025-08-06
Payer: COMMERCIAL

## 2025-08-06 DIAGNOSIS — E78.2 MIXED HYPERLIPIDEMIA: ICD-10-CM

## 2025-08-06 LAB
ALBUMIN UR-MCNC: 3.8 MG/DL
CREAT UR-MCNC: 81.7 MG/DL
MICROALBUMIN/CREAT UR: 46.5 MG/G (ref 0–29)
TSH SERPL DL<=0.05 MIU/L-ACNC: 1.37 UIU/ML (ref 0.27–4.2)

## 2025-08-06 RX ORDER — ATORVASTATIN CALCIUM 20 MG/1
20 TABLET, FILM COATED ORAL DAILY
Qty: 90 TABLET | Refills: 1 | Status: SHIPPED | OUTPATIENT
Start: 2025-08-06

## (undated) DEVICE — BLANKT WARM UPPR/BDY ARM/OUT 57X196CM

## (undated) DEVICE — CANNULA SEAL

## (undated) DEVICE — MANIP UTER RUMI TP 6.7MMX8CM BLU

## (undated) DEVICE — COVER,LIGHT HANDLE,FLX,1/PK: Brand: MEDLINE INDUSTRIES, INC.

## (undated) DEVICE — DRAPE,TOP,102X53,STERILE: Brand: MEDLINE

## (undated) DEVICE — SNAP KOVER: Brand: UNBRANDED

## (undated) DEVICE — COLUMN DRAPE

## (undated) DEVICE — APPL CHLORAPREP TINTED 26ML TEAL

## (undated) DEVICE — LAPAROVUE VISIBILITY SYSTEM LAPAROSCOPIC SOLUTIONS: Brand: LAPAROVUE

## (undated) DEVICE — ST TBG CONN PNEUMOCLEAR EVAC SMOKE HEAT/HUMID

## (undated) DEVICE — STRIP,CLOSURE,WOUND,MEDI-STRIP,1/2X4: Brand: MEDLINE

## (undated) DEVICE — TIP COVER ACCESSORY

## (undated) DEVICE — SCRB SURG BACTOSHIELD CHG 4PCT 4OZ

## (undated) DEVICE — TRENDELENBURG WINGPAD POSITIONING KIT DELUXE - WITHOUT BODY STRAP: Brand: SOULE MEDICAL

## (undated) DEVICE — DRSNG WND BORDR/ADHS NONADHR/GZ LF 2X2IN STRL

## (undated) DEVICE — BLADELESS OBTURATOR: Brand: WECK VISTA

## (undated) DEVICE — ANTIBACTERIAL UNDYED BRAIDED (POLYGLACTIN 910), SYNTHETIC ABSORBABLE SUTURE: Brand: COATED VICRYL

## (undated) DEVICE — ANTIBACTERIAL UNDYED BRAIDED (POLYGLACTIN 910), SYNTHETIC ABSORBABLE SURGICAL SUTURE: Brand: COATED VICRYL

## (undated) DEVICE — SUT VIC 2/0 CT2 27IN J269H

## (undated) DEVICE — ARM DRAPE

## (undated) DEVICE — PATIENT RETURN ELECTRODE, SINGLE-USE, CONTACT QUALITY MONITORING, ADULT, WITH 9FT CORD, FOR PATIENTS WEIGING OVER 33LBS. (15KG): Brand: MEGADYNE

## (undated) DEVICE — GLV SURG DERMASSURE GRN LF PF 7.0

## (undated) DEVICE — PK MAJ GYN DAVINCI 10

## (undated) DEVICE — MANIP UTER RUMI 2 KOH EFFICIENT 3CM BL

## (undated) DEVICE — ADHS LIQ MASTISOL 2/3ML